# Patient Record
Sex: FEMALE | Race: OTHER | Employment: PART TIME | URBAN - METROPOLITAN AREA
[De-identification: names, ages, dates, MRNs, and addresses within clinical notes are randomized per-mention and may not be internally consistent; named-entity substitution may affect disease eponyms.]

---

## 2023-09-18 ENCOUNTER — HOSPITAL ENCOUNTER (EMERGENCY)
Age: 63
Discharge: HOME | End: 2023-09-18
Attending: EMERGENCY MEDICINE

## 2023-09-18 VITALS
HEART RATE: 86 BPM | HEIGHT: 63 IN | WEIGHT: 160 LBS | SYSTOLIC BLOOD PRESSURE: 173 MMHG | OXYGEN SATURATION: 94 % | DIASTOLIC BLOOD PRESSURE: 87 MMHG | TEMPERATURE: 98.2 F | BODY MASS INDEX: 28.35 KG/M2 | RESPIRATION RATE: 18 BRPM

## 2023-09-18 DIAGNOSIS — N30.00 ACUTE CYSTITIS WITHOUT HEMATURIA: ICD-10-CM

## 2023-09-18 DIAGNOSIS — M54.50 ACUTE LEFT-SIDED LOW BACK PAIN WITHOUT SCIATICA: Primary | ICD-10-CM

## 2023-09-18 LAB
BACTERIA URNS QL MICRO: ABNORMAL
BILIRUB UR QL STRIP: NEGATIVE
CASTS #/AREA URNS LPF: ABNORMAL /LPF
CLARITY UR: ABNORMAL
COLOR UR: YELLOW
EPI CELLS #/AREA URNS HPF: ABNORMAL /HPF
GLUCOSE UR STRIP-MCNC: NEGATIVE MG/DL
HGB UR QL STRIP.AUTO: ABNORMAL
KETONES UR STRIP-MCNC: NEGATIVE MG/DL
LEUKOCYTE ESTERASE UR QL STRIP: ABNORMAL
NITRITE UR QL STRIP: NEGATIVE
PH UR STRIP: 7 [PH] (ref 5–8)
PROT UR STRIP-MCNC: NEGATIVE MG/DL
RBC #/AREA URNS HPF: ABNORMAL /HPF
SP GR UR STRIP: 1.01 (ref 1–1.03)
UROBILINOGEN UR STRIP-ACNC: NORMAL EU/DL (ref 0–1)
WBC #/AREA URNS HPF: ABNORMAL /HPF

## 2023-09-18 PROCEDURE — 96372 THER/PROPH/DIAG INJ SC/IM: CPT

## 2023-09-18 PROCEDURE — 6360000002 HC RX W HCPCS: Performed by: EMERGENCY MEDICINE

## 2023-09-18 PROCEDURE — 99284 EMERGENCY DEPT VISIT MOD MDM: CPT

## 2023-09-18 PROCEDURE — 87186 SC STD MICRODIL/AGAR DIL: CPT

## 2023-09-18 PROCEDURE — 87088 URINE BACTERIA CULTURE: CPT

## 2023-09-18 PROCEDURE — 87086 URINE CULTURE/COLONY COUNT: CPT

## 2023-09-18 PROCEDURE — 81001 URINALYSIS AUTO W/SCOPE: CPT

## 2023-09-18 PROCEDURE — 6370000000 HC RX 637 (ALT 250 FOR IP): Performed by: EMERGENCY MEDICINE

## 2023-09-18 RX ORDER — IBUPROFEN 600 MG/1
600 TABLET ORAL EVERY 6 HOURS PRN
Qty: 28 TABLET | Refills: 0 | Status: SHIPPED | OUTPATIENT
Start: 2023-09-18 | End: 2023-09-25

## 2023-09-18 RX ORDER — IBUPROFEN 600 MG/1
600 TABLET ORAL EVERY 6 HOURS PRN
Qty: 28 TABLET | Refills: 0 | Status: SHIPPED | OUTPATIENT
Start: 2023-09-18 | End: 2023-09-18 | Stop reason: SDUPTHER

## 2023-09-18 RX ORDER — LIDOCAINE 4 G/G
1 PATCH TOPICAL DAILY
Status: DISCONTINUED | OUTPATIENT
Start: 2023-09-18 | End: 2023-09-18 | Stop reason: HOSPADM

## 2023-09-18 RX ORDER — LISINOPRIL 10 MG/1
10 TABLET ORAL DAILY
COMMUNITY

## 2023-09-18 RX ORDER — LISINOPRIL 40 MG/1
40 TABLET ORAL DAILY
COMMUNITY

## 2023-09-18 RX ORDER — FLUTICASONE FUROATE 27.5 UG/1
1 SPRAY, METERED NASAL DAILY
COMMUNITY
Start: 2021-12-07

## 2023-09-18 RX ORDER — ORPHENADRINE CITRATE 30 MG/ML
60 INJECTION INTRAMUSCULAR; INTRAVENOUS ONCE
Status: COMPLETED | OUTPATIENT
Start: 2023-09-18 | End: 2023-09-18

## 2023-09-18 RX ORDER — CEPHALEXIN 500 MG/1
500 CAPSULE ORAL 2 TIMES DAILY
Qty: 14 CAPSULE | Refills: 0 | Status: SHIPPED | OUTPATIENT
Start: 2023-09-18 | End: 2023-09-25

## 2023-09-18 RX ORDER — CYCLOBENZAPRINE HCL 10 MG
10 TABLET ORAL 3 TIMES DAILY PRN
Qty: 20 TABLET | Refills: 0 | Status: SHIPPED | OUTPATIENT
Start: 2023-09-18 | End: 2023-09-25

## 2023-09-18 RX ORDER — KETOROLAC TROMETHAMINE 30 MG/ML
30 INJECTION, SOLUTION INTRAMUSCULAR; INTRAVENOUS ONCE
Status: COMPLETED | OUTPATIENT
Start: 2023-09-18 | End: 2023-09-18

## 2023-09-18 RX ORDER — CEPHALEXIN 250 MG/1
500 CAPSULE ORAL ONCE
Status: COMPLETED | OUTPATIENT
Start: 2023-09-18 | End: 2023-09-18

## 2023-09-18 RX ORDER — CYCLOBENZAPRINE HCL 10 MG
10 TABLET ORAL 3 TIMES DAILY PRN
Qty: 20 TABLET | Refills: 0 | Status: SHIPPED | OUTPATIENT
Start: 2023-09-18 | End: 2023-09-18 | Stop reason: SDUPTHER

## 2023-09-18 RX ORDER — CEPHALEXIN 500 MG/1
500 CAPSULE ORAL 2 TIMES DAILY
Qty: 14 CAPSULE | Refills: 0 | Status: SHIPPED | OUTPATIENT
Start: 2023-09-18 | End: 2023-09-18 | Stop reason: SDUPTHER

## 2023-09-18 RX ORDER — ALBUTEROL SULFATE 90 UG/1
2 AEROSOL, METERED RESPIRATORY (INHALATION) EVERY 6 HOURS PRN
COMMUNITY
Start: 2022-09-17

## 2023-09-18 RX ADMIN — CEPHALEXIN 500 MG: 250 CAPSULE ORAL at 02:17

## 2023-09-18 RX ADMIN — KETOROLAC TROMETHAMINE 30 MG: 30 INJECTION, SOLUTION INTRAMUSCULAR; INTRAVENOUS at 01:01

## 2023-09-18 RX ADMIN — ORPHENADRINE CITRATE 60 MG: 60 INJECTION INTRAMUSCULAR; INTRAVENOUS at 01:01

## 2023-09-18 ASSESSMENT — LIFESTYLE VARIABLES
HOW OFTEN DO YOU HAVE A DRINK CONTAINING ALCOHOL: NEVER
HOW MANY STANDARD DRINKS CONTAINING ALCOHOL DO YOU HAVE ON A TYPICAL DAY: PATIENT DOES NOT DRINK

## 2023-09-18 ASSESSMENT — ENCOUNTER SYMPTOMS
CONSTIPATION: 0
EYE REDNESS: 0
EYE PAIN: 0
TROUBLE SWALLOWING: 0
EYE DISCHARGE: 0
SINUS PRESSURE: 0
BACK PAIN: 1
DIARRHEA: 0
NAUSEA: 0
SORE THROAT: 0
COLOR CHANGE: 0
COUGH: 0
ABDOMINAL PAIN: 0
VOMITING: 0
RHINORRHEA: 0
FACIAL SWELLING: 0
SHORTNESS OF BREATH: 0
CHEST TIGHTNESS: 0
WHEEZING: 0
BLOOD IN STOOL: 0

## 2023-09-18 ASSESSMENT — PAIN - FUNCTIONAL ASSESSMENT: PAIN_FUNCTIONAL_ASSESSMENT: 0-10

## 2023-09-18 ASSESSMENT — PAIN DESCRIPTION - FREQUENCY: FREQUENCY: CONTINUOUS

## 2023-09-18 ASSESSMENT — PAIN SCALES - GENERAL: PAINLEVEL_OUTOF10: 7

## 2023-09-18 NOTE — ED NOTES
Patient presents to the ED with left lower flank pain for the past 3 days. Patient denies any difficulty or burning when urinating.      Yane Rojas RN  09/18/23 3024

## 2023-09-18 NOTE — ED NOTES
Pt was discharged from the ER. Discharge paperwork was reviewed with the patient. Patient had no further questions and showed an understanding of instructions.        Nicol Lundberg RN  09/18/23 4705

## 2023-09-18 NOTE — ED PROVIDER NOTES
every 6 hours as needed for Pain     PHYSICIAN CONSULTS ORDERED THIS ENCOUNTER:  None    FINAL IMPRESSION      1. Acute left-sided low back pain without sciatica    2.  Acute cystitis without hematuria          DISPOSITION/PLAN   DISPOSITION Decision To Discharge 09/18/2023 02:01:52 AM      PATIENT REFERREDTO:  [facility] ED  [address] [facility]  [phone]    If symptoms worsen      DISCHARGEMEDICATIONS:  New Prescriptions    CEPHALEXIN (KEFLEX) 500 MG CAPSULE    Take 1 capsule by mouth 2 times daily for 7 days    CYCLOBENZAPRINE (FLEXERIL) 10 MG TABLET    Take 1 tablet by mouth 3 times daily as needed for Muscle spasms    IBUPROFEN (ADVIL;MOTRIN) 600 MG TABLET    Take 1 tablet by mouth every 6 hours as needed for Pain       (Please note that portions of this note were completed with a voice recognition program.  Efforts were made to edit thedictations but occasionally words are mis-transcribed.)    Jillian Osorio MD  Attending Emergency Physician                       Jillian Osorio MD  09/18/23 8600

## 2023-09-20 LAB
MICROORGANISM SPEC CULT: ABNORMAL
SPECIMEN DESCRIPTION: ABNORMAL

## 2023-09-20 NOTE — PROGRESS NOTES
Pharmacy Note:     Patient has a urinary tract infection which was empirically treated with cephalexin. Urine culture is growing ESBL(+) E. Coli resistant to cephalosporins, but sensitive to Bactrim. Discussed case with Dr. Ivette Her and recommended Bactrim DS 1 tablet PO BID x3 days. He agrees to prescribe. Contacted patient at preferred number and discussed test results and treatment plan. Patient expressed understanding and requested prescription be sent to [facility] on [facility]. Prescription was left on pharmacy voicemail.      Thank you,  Kelin Montes, PharmD, BCPS  [phone]

## 2023-10-04 ENCOUNTER — OFFICE VISIT (OUTPATIENT)
Dept: INTERNAL MEDICINE CLINIC | Age: 63
End: 2023-10-04
Payer: MEDICARE

## 2023-10-04 VITALS
DIASTOLIC BLOOD PRESSURE: 86 MMHG | OXYGEN SATURATION: 98 % | BODY MASS INDEX: 30.47 KG/M2 | WEIGHT: 172 LBS | SYSTOLIC BLOOD PRESSURE: 180 MMHG | HEART RATE: 71 BPM

## 2023-10-04 DIAGNOSIS — Z12.11 SCREEN FOR COLON CANCER: ICD-10-CM

## 2023-10-04 DIAGNOSIS — Z12.31 ENCOUNTER FOR SCREENING MAMMOGRAM FOR BREAST CANCER: Primary | ICD-10-CM

## 2023-10-04 DIAGNOSIS — Z13.9 SCREENING DUE: ICD-10-CM

## 2023-10-04 DIAGNOSIS — Z13.1 ENCOUNTER FOR SCREENING FOR DIABETES MELLITUS: ICD-10-CM

## 2023-10-04 DIAGNOSIS — R30.0 DYSURIA: ICD-10-CM

## 2023-10-04 DIAGNOSIS — Z13.220 SCREENING FOR HYPERLIPIDEMIA: ICD-10-CM

## 2023-10-04 DIAGNOSIS — I49.8 FLUTTERING HEART: ICD-10-CM

## 2023-10-04 PROCEDURE — G0008 ADMIN INFLUENZA VIRUS VAC: HCPCS | Performed by: INTERNAL MEDICINE

## 2023-10-04 PROCEDURE — 99204 OFFICE O/P NEW MOD 45 MIN: CPT | Performed by: INTERNAL MEDICINE

## 2023-10-04 PROCEDURE — 90674 CCIIV4 VAC NO PRSV 0.5 ML IM: CPT | Performed by: INTERNAL MEDICINE

## 2023-10-04 RX ORDER — FLUTICASONE PROPIONATE 50 MCG
1 SPRAY, SUSPENSION (ML) NASAL DAILY
Qty: 32 G | Refills: 1 | Status: SHIPPED | OUTPATIENT
Start: 2023-10-04

## 2023-10-04 RX ORDER — FUROSEMIDE 20 MG/1
20 TABLET ORAL 2 TIMES DAILY
COMMUNITY
Start: 2023-08-24

## 2023-10-04 RX ORDER — FEXOFENADINE HCL 180 MG/1
180 TABLET ORAL DAILY
Qty: 30 TABLET | Refills: 2 | Status: SHIPPED | OUTPATIENT
Start: 2023-10-04

## 2023-10-04 RX ORDER — CARVEDILOL 3.12 MG/1
3.12 TABLET ORAL 2 TIMES DAILY
Qty: 60 TABLET | Refills: 3 | Status: SHIPPED | OUTPATIENT
Start: 2023-10-04

## 2023-10-04 SDOH — ECONOMIC STABILITY: INCOME INSECURITY: HOW HARD IS IT FOR YOU TO PAY FOR THE VERY BASICS LIKE FOOD, HOUSING, MEDICAL CARE, AND HEATING?: NOT HARD AT ALL

## 2023-10-04 SDOH — ECONOMIC STABILITY: FOOD INSECURITY: WITHIN THE PAST 12 MONTHS, THE FOOD YOU BOUGHT JUST DIDN'T LAST AND YOU DIDN'T HAVE MONEY TO GET MORE.: NEVER TRUE

## 2023-10-04 SDOH — ECONOMIC STABILITY: HOUSING INSECURITY
IN THE LAST 12 MONTHS, WAS THERE A TIME WHEN YOU DID NOT HAVE A STEADY PLACE TO SLEEP OR SLEPT IN A SHELTER (INCLUDING NOW)?: NO

## 2023-10-04 SDOH — ECONOMIC STABILITY: FOOD INSECURITY: WITHIN THE PAST 12 MONTHS, YOU WORRIED THAT YOUR FOOD WOULD RUN OUT BEFORE YOU GOT MONEY TO BUY MORE.: NEVER TRUE

## 2023-10-04 ASSESSMENT — PATIENT HEALTH QUESTIONNAIRE - PHQ9
SUM OF ALL RESPONSES TO PHQ QUESTIONS 1-9: 0
1. LITTLE INTEREST OR PLEASURE IN DOING THINGS: 0
SUM OF ALL RESPONSES TO PHQ QUESTIONS 1-9: 0
2. FEELING DOWN, DEPRESSED OR HOPELESS: 0
SUM OF ALL RESPONSES TO PHQ QUESTIONS 1-9: 0
SUM OF ALL RESPONSES TO PHQ9 QUESTIONS 1 & 2: 0
SUM OF ALL RESPONSES TO PHQ QUESTIONS 1-9: 0

## 2023-10-04 NOTE — PROGRESS NOTES
Visit Information    Have you changed or started any medications since your last visit including any over-the-counter medicines, vitamins, or herbal medicines? no   Are you having any side effects from any of your medications? -  no  Have you stopped taking any of your medications? Is so, why? -  no    Have you seen any other physician or provider since your last visit? Yes - Records Obtained  Have you had any other diagnostic tests since your last visit? Yes - Records Obtained  Have you been seen in the emergency room and/or had an admission to a hospital since we last saw you? Yes - Records Obtained  Have you had your routine dental cleaning in the past 6 months? no    Have you activated your [employer] account? If not, what are your barriers?  No:      Patient Care Team:  Edie Davenport MD as PCP - General (Internal Medicine)    Medical History Review  Past Medical, Family, and Social History reviewed and does contribute to the patient presenting condition    Health Maintenance   Topic Date Due    COVID-19 Vaccine (1) Never done    Depression Screen  Never done    HIV screen  Never done    Hepatitis C screen  Never done    DTaP/Tdap/Td vaccine (1 - Tdap) Never done    Cervical cancer screen  Never done    Diabetes screen  Never done    Lipids  Never done    Colorectal Cancer Screen  Never done    Breast cancer screen  Never done    Shingles vaccine (1 of 2) Never done    Flu vaccine (1) Never done    Hepatitis A vaccine  Aged Out    Hepatitis B vaccine  Aged Out    Hib vaccine  Aged Out    Meningococcal (ACWY) vaccine  Aged Out    Pneumococcal 0-64 years Vaccine  Aged Out

## 2023-10-04 NOTE — PROGRESS NOTES
[id number] [place] [id number]  Dept: [phone]  Dept Fax: [phone]    Office Progress/Follow Up Note  Date of patient's visit: 10/4/2023  Patient's Name:  Santo Givens YOB: 1960            Patient Care Team:  Vikas Mccormick MD as PCP - General (Internal Medicine)    REASON FOR VISIT: Establish care    HISTORY OF PRESENT ILLNESS:      Chief Complaint   Patient presents with    New Patient     With lower back pains was seen in er on 9/18/23 for UTI        History was obtained from the patient. Santo Givens is a 57 y.o. is here for [facility]. The patient moved from [address] [place] [address] 2 years back, since then has not seen a primary care physician. The patient reports a history of hypertension, is on lisinopril 50 mg twice daily, Lasix 20 mg twice daily, history of stroke with residual weakness from 15 years back. Also reports a history of flutter, cannot verify if she was diagnosed with atrial flutter/fibrillation. Is not on anticoagulation. Denies history of congestive heart failure. Currently denies other concerns. She reports blood pressure may be high. Given that she has not used her lisinopril this morning. She denies headaches, vision problems. She reports following a low-sodium diet. There is no problem list on file for this patient.       Health Maintenance Due   Topic Date Due    COVID-19 Vaccine (1) Never done    Depression Screen  Never done    HIV screen  Never done    Hepatitis C screen  Never done    DTaP/Tdap/Td vaccine (1 - Tdap) Never done    Cervical cancer screen  Never done    Diabetes screen  Never done    Lipids  Never done    Colorectal Cancer Screen  Never done    Breast cancer screen  Never done    Shingles vaccine (1 of 2) Never done    Flu vaccine (1) Never done       No Known Allergies      MEDICATIONS:     Current Outpatient Medications   Medication Sig Dispense Refill    furosemide (LASIX)

## 2023-10-04 NOTE — PATIENT INSTRUCTIONS
Try to exercise 30 mins a day, 5 times a week  Try getting a blood pressure machine at home, check blood pressures once a day.  Call clinic if > 140/90 despite taking new medication along with lisinopril and lasix

## 2023-10-05 ENCOUNTER — TELEPHONE (OUTPATIENT)
Dept: INTERNAL MEDICINE CLINIC | Age: 63
End: 2023-10-05

## 2023-10-05 DIAGNOSIS — Z13.1 ENCOUNTER FOR SCREENING FOR DIABETES MELLITUS: Primary | ICD-10-CM

## 2023-10-05 DIAGNOSIS — Z13.220 SCREENING FOR HYPERLIPIDEMIA: ICD-10-CM

## 2023-10-05 NOTE — TELEPHONE ENCOUNTER
Clarita from [facility] lab called and they are getting an ABN Back on patients A1C and Lipid Panel that was ordered yesterday. They are needing new orders placed.

## 2023-10-17 ENCOUNTER — TELEPHONE (OUTPATIENT)
Dept: INTERNAL MEDICINE CLINIC | Age: 63
End: 2023-10-17

## 2023-10-17 ENCOUNTER — HOSPITAL ENCOUNTER (OUTPATIENT)
Age: 63
Discharge: HOME | End: 2023-10-17
Payer: MEDICARE

## 2023-10-17 DIAGNOSIS — R73.03 PRE-DIABETES: Primary | ICD-10-CM

## 2023-10-17 DIAGNOSIS — I49.8 FLUTTERING HEART: ICD-10-CM

## 2023-10-17 DIAGNOSIS — I10 PRIMARY HYPERTENSION: ICD-10-CM

## 2023-10-17 LAB
EKG ATRIAL RATE: 70 BPM
EKG P AXIS: 61 DEGREES
EKG P-R INTERVAL: 184 MS
EKG Q-T INTERVAL: 412 MS
EKG QRS DURATION: 84 MS
EKG QTC CALCULATION (BAZETT): 444 MS
EKG R AXIS: 72 DEGREES
EKG T AXIS: 53 DEGREES
EKG VENTRICULAR RATE: 70 BPM

## 2023-10-17 PROCEDURE — 93005 ELECTROCARDIOGRAM TRACING: CPT

## 2023-10-17 PROCEDURE — 93010 ELECTROCARDIOGRAM REPORT: CPT | Performed by: INTERNAL MEDICINE

## 2023-10-17 NOTE — TELEPHONE ENCOUNTER
Patient is at the lab now and her order for Lipid and HGB A1C fired an ABN. The codes from the the 5th are not working. New Codes and orders are needed. 3rd request for this.     Please advise

## 2023-10-18 ENCOUNTER — HOSPITAL ENCOUNTER (OUTPATIENT)
Age: 63
Discharge: HOME | End: 2023-10-18
Attending: INTERNAL MEDICINE
Payer: MEDICARE

## 2023-10-18 ENCOUNTER — HOSPITAL ENCOUNTER (OUTPATIENT)
Dept: WOMENS IMAGING | Age: 63
Discharge: HOME | End: 2023-10-20
Attending: INTERNAL MEDICINE
Payer: MEDICARE

## 2023-10-18 DIAGNOSIS — R73.03 PRE-DIABETES: ICD-10-CM

## 2023-10-18 DIAGNOSIS — R30.0 DYSURIA: ICD-10-CM

## 2023-10-18 DIAGNOSIS — Z13.9 SCREENING DUE: ICD-10-CM

## 2023-10-18 DIAGNOSIS — Z12.31 ENCOUNTER FOR SCREENING MAMMOGRAM FOR BREAST CANCER: ICD-10-CM

## 2023-10-18 DIAGNOSIS — I10 PRIMARY HYPERTENSION: ICD-10-CM

## 2023-10-18 LAB
ALBUMIN SERPL-MCNC: 4.4 G/DL (ref 3.5–5.2)
ALP SERPL-CCNC: 72 U/L (ref 35–104)
ALT SERPL-CCNC: 12 U/L (ref 5–33)
ANION GAP SERPL CALCULATED.3IONS-SCNC: 10 MMOL/L (ref 9–17)
AST SERPL-CCNC: 20 U/L
BACTERIA URNS QL MICRO: ABNORMAL
BILIRUB SERPL-MCNC: 0.5 MG/DL (ref 0.3–1.2)
BILIRUB UR QL STRIP: NEGATIVE
BUN SERPL-MCNC: 14 MG/DL (ref 8–23)
CALCIUM SERPL-MCNC: 9.7 MG/DL (ref 8.6–10.4)
CASTS #/AREA URNS LPF: ABNORMAL /LPF
CHLORIDE SERPL-SCNC: 104 MMOL/L (ref 98–107)
CHOLEST SERPL-MCNC: 186 MG/DL
CHOLESTEROL/HDL RATIO: 2.5
CLARITY UR: ABNORMAL
CO2 SERPL-SCNC: 29 MMOL/L (ref 20–31)
COLOR UR: YELLOW
CREAT SERPL-MCNC: 0.7 MG/DL (ref 0.5–0.9)
EPI CELLS #/AREA URNS HPF: ABNORMAL /HPF
ERYTHROCYTE [DISTWIDTH] IN BLOOD BY AUTOMATED COUNT: 13.1 % (ref 11.5–14.9)
EST. AVERAGE GLUCOSE BLD GHB EST-MCNC: 103 MG/DL
GFR SERPL CREATININE-BSD FRML MDRD: >60 ML/MIN/1.73M2
GLUCOSE P FAST SERPL-MCNC: 98 MG/DL (ref 70–99)
GLUCOSE SERPL-MCNC: 98 MG/DL (ref 70–99)
GLUCOSE UR STRIP-MCNC: NEGATIVE MG/DL
HBA1C MFR BLD: 5.2 % (ref 4–6)
HCT VFR BLD AUTO: 40 % (ref 36–46)
HDLC SERPL-MCNC: 75 MG/DL
HGB BLD-MCNC: 13.5 G/DL (ref 12–16)
HGB UR QL STRIP.AUTO: ABNORMAL
KETONES UR STRIP-MCNC: ABNORMAL MG/DL
LDLC SERPL CALC-MCNC: 94 MG/DL (ref 0–130)
LEUKOCYTE ESTERASE UR QL STRIP: ABNORMAL
MCH RBC QN AUTO: 30.3 PG (ref 26–34)
MCHC RBC AUTO-ENTMCNC: 33.8 G/DL (ref 31–37)
MCV RBC AUTO: 89.8 FL (ref 80–100)
NITRITE UR QL STRIP: NEGATIVE
PH UR STRIP: 6 [PH] (ref 5–8)
PLATELET # BLD AUTO: 215 K/UL (ref 150–450)
PMV BLD AUTO: 7 FL (ref 6–12)
POTASSIUM SERPL-SCNC: 3.7 MMOL/L (ref 3.7–5.3)
PROT SERPL-MCNC: 7.2 G/DL (ref 6.4–8.3)
PROT UR STRIP-MCNC: ABNORMAL MG/DL
RBC # BLD AUTO: 4.45 M/UL (ref 4–5.2)
RBC #/AREA URNS HPF: ABNORMAL /HPF
SODIUM SERPL-SCNC: 143 MMOL/L (ref 135–144)
SP GR UR STRIP: 1.02 (ref 1–1.03)
TRIGL SERPL-MCNC: 84 MG/DL
UROBILINOGEN UR STRIP-ACNC: NORMAL EU/DL (ref 0–1)
WBC #/AREA URNS HPF: ABNORMAL /HPF
WBC OTHER # BLD: 5.6 K/UL (ref 3.5–11)

## 2023-10-18 PROCEDURE — 83036 HEMOGLOBIN GLYCOSYLATED A1C: CPT

## 2023-10-18 PROCEDURE — 87086 URINE CULTURE/COLONY COUNT: CPT

## 2023-10-18 PROCEDURE — 80061 LIPID PANEL: CPT

## 2023-10-18 PROCEDURE — 85027 COMPLETE CBC AUTOMATED: CPT

## 2023-10-18 PROCEDURE — 36415 COLL VENOUS BLD VENIPUNCTURE: CPT

## 2023-10-18 PROCEDURE — 80053 COMPREHEN METABOLIC PANEL: CPT

## 2023-10-18 PROCEDURE — 77063 BREAST TOMOSYNTHESIS BI: CPT

## 2023-10-18 PROCEDURE — 81001 URINALYSIS AUTO W/SCOPE: CPT

## 2023-10-19 ENCOUNTER — TELEPHONE (OUTPATIENT)
Dept: INTERNAL MEDICINE CLINIC | Age: 63
End: 2023-10-19

## 2023-10-19 DIAGNOSIS — R82.90 ABNORMAL FINDING ON URINALYSIS: Primary | ICD-10-CM

## 2023-10-19 LAB
MICROORGANISM SPEC CULT: NORMAL
SPECIMEN DESCRIPTION: NORMAL

## 2023-10-19 NOTE — TELEPHONE ENCOUNTER
Inform patient that blood work is within normal limits. Urine shows mild amount of blood and mild protein.   Would like to repeat urine testing in 2 weeks --please get this a day prior to appointment on 10/30/2023

## 2023-10-30 ENCOUNTER — HOSPITAL ENCOUNTER (OUTPATIENT)
Age: 63
Setting detail: SPECIMEN
Discharge: HOME | End: 2023-10-30

## 2023-10-30 ENCOUNTER — OFFICE VISIT (OUTPATIENT)
Dept: INTERNAL MEDICINE CLINIC | Age: 63
End: 2023-10-30
Payer: MEDICARE

## 2023-10-30 VITALS
OXYGEN SATURATION: 96 % | DIASTOLIC BLOOD PRESSURE: 86 MMHG | SYSTOLIC BLOOD PRESSURE: 132 MMHG | HEIGHT: 63 IN | HEART RATE: 78 BPM | WEIGHT: 178 LBS | BODY MASS INDEX: 31.54 KG/M2

## 2023-10-30 DIAGNOSIS — Z13.820 SCREENING FOR OSTEOPOROSIS: Primary | ICD-10-CM

## 2023-10-30 DIAGNOSIS — R82.90 ABNORMAL FINDING ON URINALYSIS: ICD-10-CM

## 2023-10-30 DIAGNOSIS — M85.89 OTHER SPECIFIED DISORDERS OF BONE DENSITY AND STRUCTURE, MULTIPLE SITES: ICD-10-CM

## 2023-10-30 LAB
BACTERIA URNS QL MICRO: ABNORMAL
BILIRUB UR QL STRIP: NEGATIVE
CASTS #/AREA URNS LPF: ABNORMAL /LPF (ref 0–8)
CLARITY UR: CLEAR
COLOR UR: YELLOW
EPI CELLS #/AREA URNS HPF: ABNORMAL /HPF (ref 0–5)
GLUCOSE UR STRIP-MCNC: NEGATIVE MG/DL
HGB UR QL STRIP.AUTO: ABNORMAL
KETONES UR STRIP-MCNC: NEGATIVE MG/DL
LEUKOCYTE ESTERASE UR QL STRIP: ABNORMAL
NITRITE UR QL STRIP: NEGATIVE
PH UR STRIP: 7.5 [PH] (ref 5–8)
PROT UR STRIP-MCNC: NEGATIVE MG/DL
RBC #/AREA URNS HPF: ABNORMAL /HPF (ref 0–4)
SP GR UR STRIP: 1 (ref 1–1.03)
UROBILINOGEN UR STRIP-ACNC: NORMAL EU/DL (ref 0–1)
WBC #/AREA URNS HPF: ABNORMAL /HPF (ref 0–5)

## 2023-10-30 PROCEDURE — 90715 TDAP VACCINE 7 YRS/> IM: CPT | Performed by: INTERNAL MEDICINE

## 2023-10-30 PROCEDURE — 1123F ACP DISCUSS/DSCN MKR DOCD: CPT | Performed by: INTERNAL MEDICINE

## 2023-10-30 PROCEDURE — G0009 ADMIN PNEUMOCOCCAL VACCINE: HCPCS | Performed by: INTERNAL MEDICINE

## 2023-10-30 PROCEDURE — 90471 IMMUNIZATION ADMIN: CPT | Performed by: INTERNAL MEDICINE

## 2023-10-30 PROCEDURE — 90732 PPSV23 VACC 2 YRS+ SUBQ/IM: CPT | Performed by: INTERNAL MEDICINE

## 2023-10-30 PROCEDURE — 99214 OFFICE O/P EST MOD 30 MIN: CPT | Performed by: INTERNAL MEDICINE

## 2023-10-30 RX ORDER — LISINOPRIL 40 MG/1
40 TABLET ORAL DAILY
Qty: 30 TABLET | Refills: 3 | Status: SHIPPED | OUTPATIENT
Start: 2023-10-30

## 2023-10-30 RX ORDER — CARVEDILOL 3.12 MG/1
3.12 TABLET ORAL 2 TIMES DAILY
Qty: 60 TABLET | Refills: 3 | Status: SHIPPED | OUTPATIENT
Start: 2023-10-30

## 2023-10-30 RX ORDER — ALBUTEROL SULFATE 90 UG/1
2 AEROSOL, METERED RESPIRATORY (INHALATION) EVERY 6 HOURS PRN
Qty: 18 G | Refills: 3 | Status: SHIPPED | OUTPATIENT
Start: 2023-10-30

## 2023-10-30 RX ORDER — PREDNISONE 20 MG/1
TABLET ORAL
COMMUNITY
Start: 2023-05-22

## 2023-10-30 RX ORDER — IBUPROFEN 600 MG/1
600 TABLET ORAL EVERY 6 HOURS PRN
Qty: 28 TABLET | Refills: 0 | Status: SHIPPED | OUTPATIENT
Start: 2023-10-30 | End: 2023-11-06

## 2023-10-30 RX ORDER — FUROSEMIDE 20 MG/1
20 TABLET ORAL 2 TIMES DAILY
Qty: 60 TABLET | Refills: 2 | Status: SHIPPED | OUTPATIENT
Start: 2023-10-30

## 2023-10-30 RX ORDER — FEXOFENADINE HCL 180 MG/1
180 TABLET ORAL DAILY
Qty: 30 TABLET | Refills: 2 | Status: SHIPPED | OUTPATIENT
Start: 2023-10-30

## 2023-10-30 NOTE — PROGRESS NOTES
[address] [place] [id number]  Dept: [phone]  Dept Fax: [phone]    Office Progress/Follow Up Note  Date of patient's visit: 10/30/2023  Patient's Name:  Brett Wiggins YOB: 1960            Patient Care Team:  Forest Ruelas MD as PCP - General (Internal Medicine)  Forest Ruelas MD as PCP - Empaneled Provider    REASON FOR VISIT: follow up visit    HISTORY OF PRESENT ILLNESS:      Chief Complaint   Patient presents with    Results     Would like to go over all recent testing. History was obtained from the patient. Brett Wiggins is a 70 y.o. is here for a follow-up visit, the patient reports feeling well. Denies any active concerns at this time. She denies any visible bleeding in her urine. Patient reports that in the past she was told that she had bone abnormality, possible loss of bone and arthritis. No joint swelling. There is no problem list on file for this patient.       Health Maintenance Due   Topic Date Due    HIV screen  Never done    Hepatitis C screen  Never done    Cervical cancer screen  Never done    Colorectal Cancer Screen  Never done    Shingles vaccine (1 of 2) Never done    DEXA (modify frequency per FRAX score)  Never done    COVID-19 Vaccine (2 - Moderna series) 10/26/2023    Annual Wellness Visit (AWV)  10/04/2023       No Known Allergies      MEDICATIONS:     Current Outpatient Medications   Medication Sig Dispense Refill    predniSONE (DELTASONE) 20 MG tablet Uses as needed      fexofenadine (ALLEGRA) 180 MG tablet Take 1 tablet by mouth daily 30 tablet 2    lisinopril (PRINIVIL;ZESTRIL) 40 MG tablet Take 1 tablet by mouth daily 30 tablet 3    ibuprofen (ADVIL;MOTRIN) 600 MG tablet Take 1 tablet by mouth every 6 hours as needed for Pain 28 tablet 0    albuterol sulfate HFA (PROVENTIL;VENTOLIN;PROAIR) 108 (90 Base) MCG/ACT inhaler Inhale 2 puffs into the lungs every 6 hours as

## 2023-10-30 NOTE — PROGRESS NOTES
Visit Information    Have you changed or started any medications since your last visit including any over-the-counter medicines, vitamins, or herbal medicines? no   Are you having any side effects from any of your medications? -  no  Have you stopped taking any of your medications? Is so, why? -  no    Have you seen any other physician or provider since your last visit? No  Have you had any other diagnostic tests since your last visit? Yes - Records Obtained  Have you been seen in the emergency room and/or had an admission to a hospital since we last saw you? No  Have you had your routine dental cleaning in the past 6 months? no    Have you activated your [employer] account? If not, what are your barriers?  No:      Patient Care Team:  Thien Man MD as PCP - General (Internal Medicine)  Thien Man MD as PCP - Empaneled Provider    Medical History Review  Past Medical, Family, and Social History reviewed and does contribute to the patient presenting condition    Health Maintenance   Topic Date Due    HIV screen  Never done    Hepatitis C screen  Never done    DTaP/Tdap/Td vaccine (1 - Tdap) Never done    Cervical cancer screen  Never done    Colorectal Cancer Screen  Never done    Shingles vaccine (1 of 2) Never done    DEXA (modify frequency per FRAX score)  Never done    Pneumococcal 65+ years Vaccine (1 - PCV) Never done    COVID-19 Vaccine (2 - Caron Lesches series) 10/26/2023    Annual Wellness Visit (AWV)  10/04/2023    Depression Screen  10/04/2024    Breast cancer screen  10/18/2025    Lipids  10/18/2028    Flu vaccine  Completed    Hepatitis A vaccine  Aged Out    Hepatitis B vaccine  Aged Out    Hib vaccine  Aged Out    Meningococcal (ACWY) vaccine  Aged Out    Diabetes screen  Discontinued

## 2023-10-31 LAB
CREAT UR-MCNC: 12.1 MG/DL (ref 28–217)
TOTAL PROTEIN, URINE: <4 MG/DL
URINE TOTAL PROTEIN CREATININE RATIO: ABNORMAL (ref 0–0.2)

## 2023-11-02 ENCOUNTER — TELEPHONE (OUTPATIENT)
Dept: INTERNAL MEDICINE CLINIC | Age: 63
End: 2023-11-02

## 2023-11-02 DIAGNOSIS — R19.5 POSITIVE COLORECTAL CANCER SCREENING USING COLOGUARD TEST: Primary | ICD-10-CM

## 2023-11-02 LAB — NONINV COLON CA DNA+OCC BLD SCRN STL QL: POSITIVE

## 2023-11-02 NOTE — TELEPHONE ENCOUNTER
Please inform patient that colon cancer screening was positive. We will need a colonoscopy to better evaluate. I will refer to gastroenterology.

## 2023-11-13 ENCOUNTER — HOSPITAL ENCOUNTER (OUTPATIENT)
Dept: WOMENS IMAGING | Age: 63
Discharge: HOME | End: 2023-11-15
Attending: INTERNAL MEDICINE
Payer: MEDICARE

## 2023-11-13 DIAGNOSIS — Z13.820 SCREENING FOR OSTEOPOROSIS: ICD-10-CM

## 2023-11-13 DIAGNOSIS — M85.89 OTHER SPECIFIED DISORDERS OF BONE DENSITY AND STRUCTURE, MULTIPLE SITES: ICD-10-CM

## 2023-11-13 PROCEDURE — 77080 DXA BONE DENSITY AXIAL: CPT

## 2023-11-16 ENCOUNTER — OFFICE VISIT (OUTPATIENT)
Dept: OBGYN CLINIC | Age: 63
End: 2023-11-16

## 2023-11-16 ENCOUNTER — TELEPHONE (OUTPATIENT)
Dept: GASTROENTEROLOGY | Age: 63
End: 2023-11-16

## 2023-11-16 ENCOUNTER — HOSPITAL ENCOUNTER (OUTPATIENT)
Age: 63
Setting detail: SPECIMEN
Discharge: HOME | End: 2023-11-16

## 2023-11-16 VITALS — WEIGHT: 179 LBS | BODY MASS INDEX: 32.94 KG/M2 | HEIGHT: 62 IN

## 2023-11-16 DIAGNOSIS — R19.5 POSITIVE COLORECTAL CANCER SCREENING USING COLOGUARD TEST: Primary | ICD-10-CM

## 2023-11-16 DIAGNOSIS — Z01.419 WELL WOMAN EXAM WITH ROUTINE GYNECOLOGICAL EXAM: Primary | ICD-10-CM

## 2023-11-16 DIAGNOSIS — Z11.51 SPECIAL SCREENING EXAMINATION FOR HUMAN PAPILLOMAVIRUS (HPV): ICD-10-CM

## 2023-11-16 PROBLEM — I10 HTN (HYPERTENSION): Status: ACTIVE | Noted: 2023-11-16

## 2023-11-16 NOTE — PROGRESS NOTES
Repeat Annual every 1 year  Cervical Cytology Evaluation begins at 24years old. If Negative Cytology, Follow-up screening per current guidelines. Mammograms every 1 year. If 37 yo and last mammogram was negative. Calcium and Vitamin D dosing reviewed. Colonoscopy screening reviewed as well as onset for bone density testing. Birth control and barrier recommendations discussed. STD counseling and prevention reviewed. Gardisil counseling completed for all patients 7-38 yo. Routine health maintenance per patients PCP. Orders Placed This Encounter   Procedures    PAP SMEAR     Patient History:    No LMP recorded. Patient is postmenopausal.  OBGYN Status: Postmenopausal  Past Surgical History:  No date: BREAST CYST EXCISION; Right  1980:  SECTION  No date: ENDOMETRIAL ABLATION  No date: SKIN CANCER EXCISION      Social History    Tobacco Use      Smoking status: Never      Smokeless tobacco: Never       Standing Status:   Future     Standing Expiration Date:   2024     Order Specific Question:   Collection Type     Answer: Thin Prep     Order Specific Question:   Prior Abnormal Pap Test     Answer:   No     Order Specific Question:   Screening or Diagnostic     Answer:   Screening     Order Specific Question:   HPV Requested? Answer:   Yes     Order Specific Question:   High Risk Patient     Answer:   N/A           The patient, Ashish Jane is a 70 y.o. female, was seen with a total time spent of 30 minutes for the visit on this date of service by the E/M provider. The time component had both face to face and non face to face time spent in determining the total time component. Counseling and education regarding her diagnosis listed below and her options regarding those diagnoses were also included in determining her time component. Diagnosis Orders   1. Well woman exam with routine gynecological exam  PAP SMEAR      2.  Special screening examination for human papillomavirus

## 2023-11-16 NOTE — TELEPHONE ENCOUNTER
Writer rec'd referral from PCP asking to schedule pt for colon, writer notes pt is not established with any provider in this practice, per colon screen questionnaire pt can be scheduled for colon. [facility] Ciro Wednesday [email or web address] colon(new positive cologuard) golytely/dulc PAT-TBD by [facility]. Reviewed bowel prep instructions with patient over phone and mailed to home address.

## 2023-11-17 RX ORDER — BISACODYL 5 MG/1
TABLET, DELAYED RELEASE ORAL
Qty: 4 TABLET | Refills: 0 | Status: SHIPPED | OUTPATIENT
Start: 2023-11-17

## 2023-11-17 RX ORDER — POLYETHYLENE GLYCOL 3350, SODIUM SULFATE ANHYDROUS, SODIUM BICARBONATE, SODIUM CHLORIDE, POTASSIUM CHLORIDE 236; 22.74; 6.74; 5.86; 2.97 G/4L; G/4L; G/4L; G/4L; G/4L
POWDER, FOR SOLUTION ORAL
Qty: 4000 ML | Refills: 0 | Status: SHIPPED | OUTPATIENT
Start: 2023-11-17

## 2023-11-21 LAB
HPV I/H RISK 4 DNA CVX QL NAA+PROBE: DETECTED
HPV SAMPLE: ABNORMAL
HPV, INTERPRETATION: ABNORMAL
HPV16 DNA CVX QL NAA+PROBE: NOT DETECTED
HPV18 DNA CVX QL NAA+PROBE: NOT DETECTED
SPECIMEN DESCRIPTION: ABNORMAL

## 2023-11-22 ENCOUNTER — HOSPITAL ENCOUNTER (OUTPATIENT)
Dept: PREADMISSION TESTING | Age: 63
Discharge: HOME | End: 2023-11-26

## 2023-11-22 VITALS — HEIGHT: 62 IN | BODY MASS INDEX: 30.36 KG/M2 | WEIGHT: 165 LBS

## 2023-11-22 RX ORDER — IBUPROFEN 600 MG/1
600 TABLET ORAL EVERY 6 HOURS PRN
COMMUNITY

## 2023-11-22 NOTE — PROGRESS NOTES
Pre-op Instructions For Out-Patient Endoscopy Surgery    Medication Instructions:  Please stop herbs and any supplements now (includes vitamins and minerals). Please contact your surgeon and prescribing physician for pre-op instructions for any blood thinners. Ibuprofen     If you have inhalers/aerosol treatments at home, please use them the morning of your surgery and bring the inhalers with you to the hospital.    Please take the following medications the morning of your surgery with a sip of water:    Inhaler, Carvedilol, and Prednisone (if taking)     Surgery Instructions:  After midnight before surgery:  Do not eat or drink anything, including water, mints, gum, and hard candy. You may brush your teeth without swallowing. No smoking, chewing tobacco, or street drugs. Please shower or bathe before surgery. Please do not wear any cologne, lotion, powder, jewelry, piercings, perfume, makeup, nail polish, hair accessories, or hair spray on the day of surgery. Wear loose comfortable clothing. Leave your valuables at home but bring a payment source for any after-surgery prescriptions you plan to fill at [facility]. Bring a storage case for any glasses/contacts. An adult who is responsible for you MUST drive you home and should be with you for the first 24 hours after surgery. The Day of Surgery:  Arrive at [facility] AT [facility] Surgery Entrance at the time directed by your surgeon and check in at the desk. If you have a living will or healthcare power of , please bring a copy. You will be taken to the pre-op holding area where you will be prepared for surgery. A physical assessment will be performed by a nurse practitioner or house officer. Your IV will be started and you will meet your anesthesiologist.    When you go to surgery, your family will be directed to the surgical waiting room, where the doctor should speak with them after your surgery.     After

## 2023-11-27 NOTE — PRE-PROCEDURE INSTRUCTIONS
No answer, left message ? Unable to leave message ? Voice mail not set up    When were you told to arrive at hospital ?      Do you have a  ? Are you on any blood thinners ? If yes when did you stop taking ? Do you have your prep Rx filled and instruction ? Nothing to eat the day before , only clear liquids. Are you experiencing any covid symptoms ? Do you have any infections or rash we should be aware of ? Do you have the Hibiclens soap to use the night before and the morning of surgery ? Nothing to eat or drink after midnight, only a sip of water to take any medication instructed to take the night before. Wear comfortable clothing, leave any valuables at home, remove any jewelry and body piercing .

## 2023-11-28 ENCOUNTER — ANESTHESIA EVENT (OUTPATIENT)
Dept: ENDOSCOPY | Age: 63
End: 2023-11-28
Payer: MEDICARE

## 2023-11-29 ENCOUNTER — ANESTHESIA (OUTPATIENT)
Dept: ENDOSCOPY | Age: 63
End: 2023-11-29
Payer: MEDICARE

## 2023-11-29 ENCOUNTER — HOSPITAL ENCOUNTER (OUTPATIENT)
Age: 63
Setting detail: OUTPATIENT SURGERY
Discharge: HOME | End: 2023-11-29
Attending: INTERNAL MEDICINE | Admitting: INTERNAL MEDICINE
Payer: MEDICARE

## 2023-11-29 VITALS
BODY MASS INDEX: 30.36 KG/M2 | HEIGHT: 62 IN | TEMPERATURE: 98.4 F | SYSTOLIC BLOOD PRESSURE: 134 MMHG | DIASTOLIC BLOOD PRESSURE: 70 MMHG | OXYGEN SATURATION: 97 % | RESPIRATION RATE: 18 BRPM | HEART RATE: 74 BPM | WEIGHT: 165 LBS

## 2023-11-29 DIAGNOSIS — R19.5 POSITIVE COLORECTAL CANCER SCREENING USING COLOGUARD TEST: ICD-10-CM

## 2023-11-29 PROCEDURE — 2500000003 HC RX 250 WO HCPCS: Performed by: NURSE ANESTHETIST, CERTIFIED REGISTERED

## 2023-11-29 PROCEDURE — 3609010600 HC COLONOSCOPY POLYPECTOMY SNARE/COLD BIOPSY: Performed by: INTERNAL MEDICINE

## 2023-11-29 PROCEDURE — 2580000003 HC RX 258: Performed by: NURSE ANESTHETIST, CERTIFIED REGISTERED

## 2023-11-29 PROCEDURE — 2580000003 HC RX 258: Performed by: ANESTHESIOLOGY

## 2023-11-29 PROCEDURE — 7100000010 HC PHASE II RECOVERY - FIRST 15 MIN: Performed by: INTERNAL MEDICINE

## 2023-11-29 PROCEDURE — 3700000001 HC ADD 15 MINUTES (ANESTHESIA): Performed by: INTERNAL MEDICINE

## 2023-11-29 PROCEDURE — 6360000002 HC RX W HCPCS: Performed by: NURSE ANESTHETIST, CERTIFIED REGISTERED

## 2023-11-29 PROCEDURE — 7100000011 HC PHASE II RECOVERY - ADDTL 15 MIN: Performed by: INTERNAL MEDICINE

## 2023-11-29 PROCEDURE — 3700000000 HC ANESTHESIA ATTENDED CARE: Performed by: INTERNAL MEDICINE

## 2023-11-29 PROCEDURE — 88305 TISSUE EXAM BY PATHOLOGIST: CPT

## 2023-11-29 PROCEDURE — 2709999900 HC NON-CHARGEABLE SUPPLY: Performed by: INTERNAL MEDICINE

## 2023-11-29 RX ORDER — PROPOFOL 10 MG/ML
INJECTION, EMULSION INTRAVENOUS PRN
Status: DISCONTINUED | OUTPATIENT
Start: 2023-11-29 | End: 2023-11-29 | Stop reason: SDUPTHER

## 2023-11-29 RX ORDER — SODIUM CHLORIDE, SODIUM LACTATE, POTASSIUM CHLORIDE, CALCIUM CHLORIDE 600; 310; 30; 20 MG/100ML; MG/100ML; MG/100ML; MG/100ML
INJECTION, SOLUTION INTRAVENOUS CONTINUOUS
Status: DISCONTINUED | OUTPATIENT
Start: 2023-11-29 | End: 2023-11-29 | Stop reason: HOSPADM

## 2023-11-29 RX ORDER — SODIUM CHLORIDE 0.9 % (FLUSH) 0.9 %
5-40 SYRINGE (ML) INJECTION PRN
Status: DISCONTINUED | OUTPATIENT
Start: 2023-11-29 | End: 2023-11-29 | Stop reason: HOSPADM

## 2023-11-29 RX ORDER — SODIUM CHLORIDE 9 MG/ML
INJECTION, SOLUTION INTRAVENOUS PRN
Status: DISCONTINUED | OUTPATIENT
Start: 2023-11-29 | End: 2023-11-29 | Stop reason: HOSPADM

## 2023-11-29 RX ORDER — SODIUM CHLORIDE, SODIUM LACTATE, POTASSIUM CHLORIDE, CALCIUM CHLORIDE 600; 310; 30; 20 MG/100ML; MG/100ML; MG/100ML; MG/100ML
INJECTION, SOLUTION INTRAVENOUS CONTINUOUS PRN
Status: DISCONTINUED | OUTPATIENT
Start: 2023-11-29 | End: 2023-11-29 | Stop reason: SDUPTHER

## 2023-11-29 RX ORDER — GLYCOPYRROLATE 0.2 MG/ML
INJECTION INTRAMUSCULAR; INTRAVENOUS PRN
Status: DISCONTINUED | OUTPATIENT
Start: 2023-11-29 | End: 2023-11-29 | Stop reason: SDUPTHER

## 2023-11-29 RX ORDER — LIDOCAINE HYDROCHLORIDE 10 MG/ML
1 INJECTION, SOLUTION EPIDURAL; INFILTRATION; INTRACAUDAL; PERINEURAL
Status: DISCONTINUED | OUTPATIENT
Start: 2023-11-29 | End: 2023-11-29 | Stop reason: HOSPADM

## 2023-11-29 RX ORDER — LIDOCAINE HYDROCHLORIDE 10 MG/ML
INJECTION, SOLUTION EPIDURAL; INFILTRATION; INTRACAUDAL; PERINEURAL PRN
Status: DISCONTINUED | OUTPATIENT
Start: 2023-11-29 | End: 2023-11-29 | Stop reason: SDUPTHER

## 2023-11-29 RX ORDER — SODIUM CHLORIDE 0.9 % (FLUSH) 0.9 %
5-40 SYRINGE (ML) INJECTION EVERY 12 HOURS SCHEDULED
Status: DISCONTINUED | OUTPATIENT
Start: 2023-11-29 | End: 2023-11-29 | Stop reason: HOSPADM

## 2023-11-29 RX ADMIN — SODIUM CHLORIDE, POTASSIUM CHLORIDE, SODIUM LACTATE AND CALCIUM CHLORIDE: 600; 310; 30; 20 INJECTION, SOLUTION INTRAVENOUS at 08:50

## 2023-11-29 RX ADMIN — SODIUM CHLORIDE, POTASSIUM CHLORIDE, SODIUM LACTATE AND CALCIUM CHLORIDE: 600; 310; 30; 20 INJECTION, SOLUTION INTRAVENOUS at 10:22

## 2023-11-29 RX ADMIN — LIDOCAINE HYDROCHLORIDE 50 MG: 10 INJECTION, SOLUTION EPIDURAL; INFILTRATION; INTRACAUDAL; PERINEURAL at 10:27

## 2023-11-29 RX ADMIN — PROPOFOL 450 MG: 10 INJECTION, EMULSION INTRAVENOUS at 10:27

## 2023-11-29 RX ADMIN — GLYCOPYRROLATE 0.2 MG: 0.2 INJECTION INTRAMUSCULAR; INTRAVENOUS at 10:38

## 2023-11-29 ASSESSMENT — PAIN - FUNCTIONAL ASSESSMENT: PAIN_FUNCTIONAL_ASSESSMENT: 0-10

## 2023-11-29 ASSESSMENT — ENCOUNTER SYMPTOMS
SHORTNESS OF BREATH: 0
SORE THROAT: 0
BACK PAIN: 1
COUGH: 0

## 2023-11-29 NOTE — DISCHARGE INSTRUCTIONS
lot of processed, low fiber foods. Diverticuli develop due to firm stool and high pressures in the colon, along with secondary spasm, which causes outpouchings to occus where the small arteries penetrate the wall of the colon to feed the internal lining. These occur most commonly on the left side and lower portions of the colon. Diverticuli can then cause bleeding from the arteries at these sites of weakness when they rupture or the diveritculi can get blocked with stool and debris and become obstructed, causing diverticulosis, which is due to an infection. When these are infected, diverticulitis, it is often treated with antibiotics and bowel rest, but when severe, recurrent, or if rupture of the colon occurs, it may require surgery. Following appropriate dietary changes and taking the proper precautions is therefore very important. DIET:  You should increase your dietary fiber intake and take a fiber supplement twice a day. Make sure that you are taking a supplement that is just fiber and is not a laxative, which should be noted on the package. Starting a fiber supplement may cause increased gas, more frequent bowel movements, and distension at first but this should improve after a couple of weeks. Try to eat whole wheat breads and pasta, more fruits and vegetables, along with brown rice and plenty of fluids. Avoid small undigestible food items that could get stuck in these outpouchings, such as unpopped popcorn kernals, whole corn, small undigestible seeds, etc..    ACTIVITY:  Exercise is also a great way to prevent constipation and is encouraged. It may also help prevent progression of your diverticulosis. Always make sure you take in plenty of fluids when exercising. MEDICATIONS:  Take an over-the-counter fiber supplement as noted above twice daily. If your symptoms don't improve with fiber and dietary changes alone your physician may also recommend psyllium or methylcellulose as well.   If

## 2023-11-29 NOTE — ANESTHESIA PRE PROCEDURE
Department of Anesthesiology  Preprocedure Note       Name:  Coni Duran   Age:  70 y.o.  :  1960                                          MRN:  [id number]         Date:  2023      Surgeon: Taj West):  Yari Calderon MD    Procedure: Procedure(s):  COLONOSCOPY DIAGNOSTIC    Medications prior to admission:   Prior to Admission medications    Medication Sig Start Date End Date Taking?  Authorizing Provider   ibuprofen (ADVIL;MOTRIN) 600 MG tablet Take 1 tablet by mouth every 6 hours as needed for Pain    Les Neri MD   polyethylene glycol (GOLYTELY) 236 g solution Please follow instructions given to you by your provider  Patient not taking: Reported on 2023   Yari Calderon MD   bisacodyl 5 MG EC tablet Please follow instructions given to you by your provider  Patient not taking: Reported on 2023   Yari Calderon MD   predniSONE (DELTASONE) 20 MG tablet Uses as needed 23   Les Neri MD   fexofenadine (ALLEGRA) 180 MG tablet Take 1 tablet by mouth daily 10/30/23   Rakan Solano MD   lisinopril (PRINIVIL;ZESTRIL) 40 MG tablet Take 1 tablet by mouth daily 10/30/23   Rakan Solano MD   albuterol sulfate HFA (PROVENTIL;VENTOLIN;PROAIR) 108 (90 Base) MCG/ACT inhaler Inhale 2 puffs into the lungs every 6 hours as needed for Wheezing 10/30/23   Rakan Solano MD   furosemide (LASIX) 20 MG tablet Take 1 tablet by mouth 2 times daily 10/30/23   Rakan Solano MD   carvedilol (COREG) 3.125 MG tablet Take 1 tablet by mouth 2 times daily 10/30/23   Rakan Solano MD   fluticasone (FLONASE) 50 MCG/ACT nasal spray 1 spray by Each Nostril route daily Use for allergies  Patient not taking: Reported on 2023 10/4/23   Rakan Solano MD   fluticasone (FLONASE SENSIMIST) 27.5 MCG/SPRAY nasal spray 1 spray by Nasal route daily  Patient not taking: Reported on 2023   Les Neri MD       Current medications:    No current

## 2023-11-29 NOTE — OP NOTE
criteria is met . The prep was good. Findings:  Terminal ileum: normal    Cecum/Ascending colon: normal    Transverse colon: normal    Descending/Sigmoid colon: abnormal:   3 tiny sessile polyps in the sigmoid colon the largest was 9 mm were removed with cold snare    Diverticulosis    Rectum/Anus: examined in normal and retroflexed positions and was abnormal:   Large hemorrhoids        Withdrawal Time was (minutes): 10    The colon was decompressed and the scope was removed. The patient tolerated the procedure well.      Recommendations/Plan:   Lifestyle and dietary modifications as discussed  F/U Biopsies  F/U In OfficeNo  Discussed with the family  Repeat colonoscopy [id number]  Patient did have some vagal bradycardia 1 we were going through the hepatic flexure area, for which we will suggest to the primary care physician may be cardiac workup if indicated    Electronically signed by Will Walden MD  on 11/29/2023 at 10:52 AM
negative...

## 2023-11-29 NOTE — H&P
on 11/22/2023) 4000 mL 0    bisacodyl 5 MG EC tablet Please follow instructions given to you by your provider (Patient not taking: Reported on 11/22/2023) 4 tablet 0    predniSONE (DELTASONE) 20 MG tablet Uses as needed      fexofenadine (ALLEGRA) 180 MG tablet Take 1 tablet by mouth daily 30 tablet 2    lisinopril (PRINIVIL;ZESTRIL) 40 MG tablet Take 1 tablet by mouth daily 30 tablet 3    albuterol sulfate HFA (PROVENTIL;VENTOLIN;PROAIR) 108 (90 Base) MCG/ACT inhaler Inhale 2 puffs into the lungs every 6 hours as needed for Wheezing 18 g 3    furosemide (LASIX) 20 MG tablet Take 1 tablet by mouth 2 times daily 60 tablet 2    carvedilol (COREG) 3.125 MG tablet Take 1 tablet by mouth 2 times daily 60 tablet 3    fluticasone (FLONASE) 50 MCG/ACT nasal spray 1 spray by Each Nostril route daily Use for allergies (Patient not taking: Reported on 11/22/2023) 32 g 1    fluticasone (FLONASE SENSIMIST) 27.5 MCG/SPRAY nasal spray 1 spray by Nasal route daily (Patient not taking: Reported on 11/22/2023)       Notation: Above medications are not currently reconciled at time of signing this H&P note, to be reconciled in pre-op per RN. Review of Systems   Constitutional:  Negative for chills and fever. HENT:  Negative for dental problem, hearing loss and sore throat. Eyes:  Positive for visual disturbance (Glasses). Respiratory:  Negative for cough and shortness of breath. Cardiovascular:  Negative for chest pain and palpitations. Gastrointestinal:         See HPI   Genitourinary:  Negative for dysuria and hematuria. Musculoskeletal:  Positive for back pain. Negative for neck pain. Skin:  Negative for rash and wound. Neurological:  Negative for speech difficulty. Hematological:  Does not bruise/bleed easily. GENERAL PHYSICAL EXAM     Vitals: Review vitals per RN flowsheet. Physical Exam  Constitutional:       General: She is not in acute distress.      Appearance:

## 2023-11-30 ENCOUNTER — TELEPHONE (OUTPATIENT)
Dept: GASTROENTEROLOGY | Age: 63
End: 2023-11-30

## 2023-11-30 NOTE — TELEPHONE ENCOUNTER
Please notify PCP         Recommendations/Plan:   Lifestyle and dietary modifications as discussed  F/U Biopsies  F/U In OfficeNo  Discussed with the family  Repeat colonoscopy [id number]  Patient did have some vagal bradycardia 1 we were going through the hepatic flexure area, for which we will suggest to the primary care physician may be cardiac workup if indicated     Electronically signed by Janeen Tanner MD  on 11/29/2023 at 10:52 AM

## 2023-12-01 LAB — SURGICAL PATHOLOGY REPORT: NORMAL

## 2023-12-05 ENCOUNTER — TELEPHONE (OUTPATIENT)
Dept: GASTROENTEROLOGY | Age: 63
End: 2023-12-05

## 2023-12-05 LAB — CYTOLOGY REPORT: NORMAL

## 2023-12-05 NOTE — TELEPHONE ENCOUNTER
Writer called PCP Dr Goyo Andre office to inform Provider during colonoscopy patient did have some vagal bradycardia while going through the hepatic flexure area, per Dr. Fior Garcia. Dr Fior Garcia is suggesting a cardiac workup if indicated. Writer spoke with Aurea Stafford.

## 2023-12-07 ENCOUNTER — TELEPHONE (OUTPATIENT)
Dept: OBGYN CLINIC | Age: 63
End: 2023-12-07

## 2023-12-07 NOTE — TELEPHONE ENCOUNTER
Tried calling patient regarding results. No answer no voicemail set up. Pap smear ASCUS  +HPV other detected  AGE 70  Needs colposcopy scheduled.

## 2023-12-07 NOTE — TELEPHONE ENCOUNTER
----- Message from SHAY Simon CNP sent at 12/5/2023  3:25 PM EST -----  Pap smear ASCUS  +HPV other detected  AGE 63  Needs colposcopy scheduled.

## 2023-12-07 NOTE — TELEPHONE ENCOUNTER
Parminder Cuellar pt notified of results and recommendations. Pt to call back to schedule colposcopy due to being at work currently. Pap smear ASCUS  +HPV other detected  AGE 70  Needs colposcopy scheduled.

## 2023-12-07 NOTE — TELEPHONE ENCOUNTER
----- Message from SHAY West CNP sent at 12/5/2023  3:25 PM EST -----  Pap smear ASCUS  +HPV other detected  AGE 63  Needs colposcopy scheduled.

## 2023-12-11 DIAGNOSIS — R19.5 POSITIVE COLORECTAL CANCER SCREENING USING COLOGUARD TEST: ICD-10-CM

## 2023-12-30 ENCOUNTER — HOSPITAL ENCOUNTER (OUTPATIENT)
Age: 63
Discharge: HOME | End: 2023-12-30
Payer: MEDICARE

## 2023-12-30 DIAGNOSIS — R00.1 BRADYCARDIA: ICD-10-CM

## 2023-12-30 LAB
ANION GAP SERPL CALCULATED.3IONS-SCNC: 8 MMOL/L (ref 9–17)
BUN SERPL-MCNC: 14 MG/DL (ref 8–23)
CALCIUM SERPL-MCNC: 10 MG/DL (ref 8.6–10.4)
CHLORIDE SERPL-SCNC: 103 MMOL/L (ref 98–107)
CO2 SERPL-SCNC: 30 MMOL/L (ref 20–31)
CREAT SERPL-MCNC: 0.7 MG/DL (ref 0.5–0.9)
GFR SERPL CREATININE-BSD FRML MDRD: >60 ML/MIN/1.73M2
GLUCOSE SERPL-MCNC: 92 MG/DL (ref 70–99)
MAGNESIUM SERPL-MCNC: 2.1 MG/DL (ref 1.6–2.6)
POTASSIUM SERPL-SCNC: 3.7 MMOL/L (ref 3.7–5.3)
SODIUM SERPL-SCNC: 141 MMOL/L (ref 135–144)
TSH SERPL DL<=0.05 MIU/L-ACNC: 1.53 UIU/ML (ref 0.3–5)

## 2023-12-30 PROCEDURE — 83735 ASSAY OF MAGNESIUM: CPT

## 2023-12-30 PROCEDURE — 80048 BASIC METABOLIC PNL TOTAL CA: CPT

## 2023-12-30 PROCEDURE — 36415 COLL VENOUS BLD VENIPUNCTURE: CPT

## 2023-12-30 PROCEDURE — 84443 ASSAY THYROID STIM HORMONE: CPT

## 2024-01-12 ENCOUNTER — APPOINTMENT (OUTPATIENT)
Dept: CT IMAGING | Age: 64
End: 2024-01-12
Payer: MEDICARE

## 2024-01-12 ENCOUNTER — HOSPITAL ENCOUNTER (INPATIENT)
Age: 64
LOS: 7 days | Discharge: HOME | End: 2024-01-19
Attending: PSYCHIATRY & NEUROLOGY | Admitting: PSYCHIATRY & NEUROLOGY
Payer: MEDICARE

## 2024-01-12 ENCOUNTER — HOSPITAL ENCOUNTER (EMERGENCY)
Age: 64
Discharge: ANOTHER HOSPITAL | End: 2024-01-12
Attending: EMERGENCY MEDICINE
Payer: MEDICARE

## 2024-01-12 VITALS
SYSTOLIC BLOOD PRESSURE: 135 MMHG | BODY MASS INDEX: 29.23 KG/M2 | RESPIRATION RATE: 23 BRPM | WEIGHT: 165 LBS | HEIGHT: 63 IN | DIASTOLIC BLOOD PRESSURE: 65 MMHG | OXYGEN SATURATION: 96 % | TEMPERATURE: 98.2 F | HEART RATE: 86 BPM

## 2024-01-12 DIAGNOSIS — E04.1 THYROID NODULE INCIDENTALLY NOTED ON IMAGING STUDY: Primary | ICD-10-CM

## 2024-01-12 DIAGNOSIS — I72.9 ANEURYSM (HCC): Primary | ICD-10-CM

## 2024-01-12 PROBLEM — I67.1 CEREBRAL ANEURYSM: Status: ACTIVE | Noted: 2024-01-12

## 2024-01-12 PROBLEM — I67.1 INTRACRANIAL ANEURYSM: Status: ACTIVE | Noted: 2024-01-12

## 2024-01-12 LAB
ANION GAP SERPL CALCULATED.3IONS-SCNC: 9 MMOL/L (ref 9–17)
BASOPHILS # BLD: 0 K/UL (ref 0–0.2)
BASOPHILS NFR BLD: 0 % (ref 0–2)
BUN SERPL-MCNC: 15 MG/DL (ref 8–23)
CALCIUM SERPL-MCNC: 9.4 MG/DL (ref 8.6–10.4)
CHLORIDE SERPL-SCNC: 106 MMOL/L (ref 98–107)
CO2 SERPL-SCNC: 25 MMOL/L (ref 20–31)
CREAT SERPL-MCNC: 0.6 MG/DL (ref 0.5–0.9)
EKG ATRIAL RATE: 72 BPM
EKG P AXIS: 59 DEGREES
EKG P-R INTERVAL: 182 MS
EKG Q-T INTERVAL: 396 MS
EKG QRS DURATION: 80 MS
EKG QTC CALCULATION (BAZETT): 433 MS
EKG R AXIS: 30 DEGREES
EKG T AXIS: 54 DEGREES
EKG VENTRICULAR RATE: 72 BPM
EOSINOPHIL # BLD: 0 K/UL (ref 0–0.4)
EOSINOPHILS RELATIVE PERCENT: 0 % (ref 0–4)
ERYTHROCYTE [DISTWIDTH] IN BLOOD BY AUTOMATED COUNT: 13.5 % (ref 11.5–14.9)
GFR SERPL CREATININE-BSD FRML MDRD: >60 ML/MIN/1.73M2
GLUCOSE SERPL-MCNC: 131 MG/DL (ref 70–99)
HCT VFR BLD AUTO: 38.9 % (ref 36–46)
HGB BLD-MCNC: 13 G/DL (ref 12–16)
LYMPHOCYTES NFR BLD: 1.84 K/UL (ref 1–4.8)
LYMPHOCYTES RELATIVE PERCENT: 12 % (ref 24–44)
MCH RBC QN AUTO: 29.8 PG (ref 26–34)
MCHC RBC AUTO-ENTMCNC: 33.3 G/DL (ref 31–37)
MCV RBC AUTO: 89.4 FL (ref 80–100)
MONOCYTES NFR BLD: 0.92 K/UL (ref 0.1–1.3)
MONOCYTES NFR BLD: 6 % (ref 1–7)
MORPHOLOGY: NORMAL
NEUTROPHILS NFR BLD: 82 % (ref 36–66)
NEUTS SEG NFR BLD: 12.54 K/UL (ref 1.3–9.1)
PLATELET # BLD AUTO: 238 K/UL (ref 150–450)
PMV BLD AUTO: 6.9 FL (ref 6–12)
POTASSIUM SERPL-SCNC: 3.7 MMOL/L (ref 3.7–5.3)
RBC # BLD AUTO: 4.35 M/UL (ref 4–5.2)
SODIUM SERPL-SCNC: 140 MMOL/L (ref 135–144)
TROPONIN I SERPL HS-MCNC: <6 NG/L (ref 0–14)
WBC OTHER # BLD: 15.3 K/UL (ref 3.5–11)

## 2024-01-12 PROCEDURE — 84484 ASSAY OF TROPONIN QUANT: CPT

## 2024-01-12 PROCEDURE — 85025 COMPLETE CBC W/AUTO DIFF WBC: CPT

## 2024-01-12 PROCEDURE — 70450 CT HEAD/BRAIN W/O DYE: CPT

## 2024-01-12 PROCEDURE — 6370000000 HC RX 637 (ALT 250 FOR IP): Performed by: EMERGENCY MEDICINE

## 2024-01-12 PROCEDURE — 99285 EMERGENCY DEPT VISIT HI MDM: CPT

## 2024-01-12 PROCEDURE — 2580000003 HC RX 258: Performed by: EMERGENCY MEDICINE

## 2024-01-12 PROCEDURE — 96365 THER/PROPH/DIAG IV INF INIT: CPT

## 2024-01-12 PROCEDURE — 70498 CT ANGIOGRAPHY NECK: CPT

## 2024-01-12 PROCEDURE — 6360000002 HC RX W HCPCS: Performed by: EMERGENCY MEDICINE

## 2024-01-12 PROCEDURE — 36415 COLL VENOUS BLD VENIPUNCTURE: CPT

## 2024-01-12 PROCEDURE — 6360000004 HC RX CONTRAST MEDICATION: Performed by: EMERGENCY MEDICINE

## 2024-01-12 PROCEDURE — 96367 TX/PROPH/DG ADDL SEQ IV INF: CPT

## 2024-01-12 PROCEDURE — 2000000000 HC ICU R&B

## 2024-01-12 PROCEDURE — 80048 BASIC METABOLIC PNL TOTAL CA: CPT

## 2024-01-12 RX ORDER — NIMODIPINE 30 MG/1
60 CAPSULE, LIQUID FILLED ORAL
Status: DISCONTINUED | OUTPATIENT
Start: 2024-01-13 | End: 2024-01-19 | Stop reason: HOSPADM

## 2024-01-12 RX ORDER — NICARDIPINE HYDROCHLORIDE 0.1 MG/ML
2.5-15 INJECTION INTRAVENOUS CONTINUOUS
Status: DISCONTINUED | OUTPATIENT
Start: 2024-01-13 | End: 2024-01-15

## 2024-01-12 RX ORDER — ACETAMINOPHEN 325 MG/1
650 TABLET ORAL EVERY 4 HOURS PRN
Status: DISCONTINUED | OUTPATIENT
Start: 2024-01-12 | End: 2024-01-15 | Stop reason: SDUPTHER

## 2024-01-12 RX ORDER — NIMODIPINE 30 MG/1
60 CAPSULE, LIQUID FILLED ORAL
Status: DISCONTINUED | OUTPATIENT
Start: 2024-01-12 | End: 2024-01-12 | Stop reason: HOSPADM

## 2024-01-12 RX ORDER — ONDANSETRON 2 MG/ML
4 INJECTION INTRAMUSCULAR; INTRAVENOUS EVERY 6 HOURS PRN
Status: DISCONTINUED | OUTPATIENT
Start: 2024-01-12 | End: 2024-01-15 | Stop reason: SDUPTHER

## 2024-01-12 RX ORDER — SODIUM CHLORIDE 9 MG/ML
INJECTION, SOLUTION INTRAVENOUS PRN
Status: DISCONTINUED | OUTPATIENT
Start: 2024-01-12 | End: 2024-01-16

## 2024-01-12 RX ORDER — SODIUM CHLORIDE 0.9 % (FLUSH) 0.9 %
10 SYRINGE (ML) INJECTION PRN
Status: COMPLETED | OUTPATIENT
Start: 2024-01-12 | End: 2024-01-12

## 2024-01-12 RX ORDER — ONDANSETRON 4 MG/1
4 TABLET, ORALLY DISINTEGRATING ORAL EVERY 8 HOURS PRN
Status: DISCONTINUED | OUTPATIENT
Start: 2024-01-12 | End: 2024-01-15 | Stop reason: SDUPTHER

## 2024-01-12 RX ORDER — CARVEDILOL 3.12 MG/1
3.12 TABLET ORAL 2 TIMES DAILY WITH MEALS
Status: DISCONTINUED | OUTPATIENT
Start: 2024-01-12 | End: 2024-01-12 | Stop reason: HOSPADM

## 2024-01-12 RX ORDER — SODIUM CHLORIDE 9 MG/ML
INJECTION, SOLUTION INTRAVENOUS ONCE
Status: COMPLETED | OUTPATIENT
Start: 2024-01-12 | End: 2024-01-12

## 2024-01-12 RX ORDER — SODIUM CHLORIDE 0.9 % (FLUSH) 0.9 %
5-40 SYRINGE (ML) INJECTION PRN
Status: DISCONTINUED | OUTPATIENT
Start: 2024-01-12 | End: 2024-01-16

## 2024-01-12 RX ORDER — SODIUM CHLORIDE 9 MG/ML
INJECTION, SOLUTION INTRAVENOUS CONTINUOUS
Status: DISCONTINUED | OUTPATIENT
Start: 2024-01-13 | End: 2024-01-15

## 2024-01-12 RX ORDER — LEVETIRACETAM 5 MG/ML
500 INJECTION INTRAVASCULAR EVERY 12 HOURS
Status: DISCONTINUED | OUTPATIENT
Start: 2024-01-13 | End: 2024-01-13

## 2024-01-12 RX ORDER — MAGNESIUM SULFATE 1 G/100ML
1000 INJECTION INTRAVENOUS PRN
Status: DISCONTINUED | OUTPATIENT
Start: 2024-01-12 | End: 2024-01-19 | Stop reason: HOSPADM

## 2024-01-12 RX ORDER — SODIUM CHLORIDE 0.9 % (FLUSH) 0.9 %
5-40 SYRINGE (ML) INJECTION EVERY 12 HOURS SCHEDULED
Status: DISCONTINUED | OUTPATIENT
Start: 2024-01-13 | End: 2024-01-16

## 2024-01-12 RX ADMIN — IOPAMIDOL 75 ML: 755 INJECTION, SOLUTION INTRAVENOUS at 18:54

## 2024-01-12 RX ADMIN — NIMODIPINE 60 MG: 30 CAPSULE, LIQUID FILLED ORAL at 21:24

## 2024-01-12 RX ADMIN — SODIUM CHLORIDE 5 MG/HR: 9 INJECTION, SOLUTION INTRAVENOUS at 22:18

## 2024-01-12 RX ADMIN — SODIUM CHLORIDE, PRESERVATIVE FREE 10 ML: 5 INJECTION INTRAVENOUS at 18:54

## 2024-01-12 RX ADMIN — SODIUM CHLORIDE 1000 MG: 9 INJECTION, SOLUTION INTRAVENOUS at 21:38

## 2024-01-12 RX ADMIN — SODIUM CHLORIDE: 9 INJECTION, SOLUTION INTRAVENOUS at 18:57

## 2024-01-12 RX ADMIN — CARVEDILOL 3.12 MG: 3.12 TABLET, FILM COATED ORAL at 18:58

## 2024-01-12 ASSESSMENT — ENCOUNTER SYMPTOMS
EYE REDNESS: 0
SORE THROAT: 0
DIARRHEA: 0
COUGH: 0
CONSTIPATION: 0
SHORTNESS OF BREATH: 0
NAUSEA: 0
EYE PAIN: 0
VOMITING: 0
BACK PAIN: 0
CHEST TIGHTNESS: 0
ABDOMINAL PAIN: 0

## 2024-01-12 ASSESSMENT — PAIN SCALES - GENERAL: PAINLEVEL_OUTOF10: 5

## 2024-01-12 ASSESSMENT — PAIN DESCRIPTION - ORIENTATION: ORIENTATION: POSTERIOR

## 2024-01-12 ASSESSMENT — PAIN - FUNCTIONAL ASSESSMENT: PAIN_FUNCTIONAL_ASSESSMENT: 0-10

## 2024-01-12 ASSESSMENT — LIFESTYLE VARIABLES
HOW MANY STANDARD DRINKS CONTAINING ALCOHOL DO YOU HAVE ON A TYPICAL DAY: PATIENT DOES NOT DRINK
HOW OFTEN DO YOU HAVE A DRINK CONTAINING ALCOHOL: NEVER

## 2024-01-12 ASSESSMENT — PAIN DESCRIPTION - PAIN TYPE: TYPE: ACUTE PAIN

## 2024-01-12 ASSESSMENT — PAIN DESCRIPTION - LOCATION: LOCATION: HEAD

## 2024-01-12 NOTE — ED PROVIDER NOTES
Reviewed   BASIC METABOLIC PANEL - Abnormal; Notable for the following components:       Result Value    Glucose 131 (*)     All other components within normal limits   CBC WITH AUTO DIFFERENTIAL - Abnormal; Notable for the following components:    WBC 15.3 (*)     Neutrophils % 82 (*)     Lymphocytes % 12 (*)     Neutrophils Absolute 12.54 (*)     All other components within normal limits   TROPONIN     EMERGENCY DEPARTMENT COURSE:   Vitals:    Vitals:    01/12/24 2030 01/12/24 2045 01/12/24 2100 01/12/24 2124   BP: 134/74  (!) 115/58 (!) 170/80   Pulse: (!) 0 (!) 0     Resp: 15 (!) 45 (!) 37    Temp:       TempSrc:       SpO2: 94% 96% 94%    Weight:       Height:           The patient was given the following medications while in the emergency department:  Orders Placed This Encounter   Medications    carvedilol (COREG) tablet 3.125 mg    iopamidol (ISOVUE-370) 76 % injection 75 mL    0.9 % sodium chloride infusion    sodium chloride flush 0.9 % injection 10 mL    niMODipine (NIMOTOP) capsule 60 mg    niCARdipine (CARDENE) 25 mg in sodium chloride 0.9 % 250 mL infusion     Order Specific Question:   Titrate Infusion?     Answer:   Yes     Order Specific Question:   Initial Infusion Rate:     Answer:   5 mg/hr     Order Specific Question:   Goal of Therapy is:     Answer:   Other     Order Specific Question:   Other Goal:     Answer:   SBP goal      Order Specific Question:   Contact Provider if:     Answer:   Patient is receiving the maximum dose and is not achieving the goal of therapy    levETIRAcetam (KEPPRA) 1,000 mg in sodium chloride 0.9 % 100 mL IVPB     CONSULTS:  IP CONSULT TO NEUROLOGY    FINAL IMPRESSION      1. Thyroid nodule incidentally noted on imaging study          DISPOSITION/PLAN   DISPOSITION Decision To Transfer 01/12/2024 09:11:36 PM      PATIENT REFERRED TO:  Abel Cuenca MD  [address] [id number]  [phone]          DISCHARGE MEDICATIONS:  New Prescriptions    No

## 2024-01-12 NOTE — ED TRIAGE NOTES
Mode of arrival (squad #, walk in, police, etc) : walk in         Chief complaint(s): HTN        Arrival Note (brief scenario, treatment PTA, etc).: has has increased BP xs 2 days- seen at other ED yesterday and also had rash- given steroids. PT has headaches.  PCP changed BP med 2 months ago.         C= \"Have you ever felt that you should Cut down on your drinking?\"  No  A= \"Have people Annoyed you by criticizing your drinking?\"  No  G= \"Have you ever felt bad or Guilty about your drinking?\"  No  E= \"Have you ever had a drink as an Eye-opener first thing in the morning to steady your nerves or to help a hangover?\"  No      Deferred []      Reason for deferring: N/A    *If yes to two or more: probable alcohol abuse.*

## 2024-01-13 ENCOUNTER — APPOINTMENT (OUTPATIENT)
Dept: MRI IMAGING | Age: 64
End: 2024-01-13
Attending: PSYCHIATRY & NEUROLOGY
Payer: MEDICARE

## 2024-01-13 ENCOUNTER — APPOINTMENT (OUTPATIENT)
Dept: INTERVENTIONAL RADIOLOGY/VASCULAR | Age: 64
End: 2024-01-13
Attending: PSYCHIATRY & NEUROLOGY
Payer: MEDICARE

## 2024-01-13 ENCOUNTER — ANESTHESIA (OUTPATIENT)
Dept: INTERVENTIONAL RADIOLOGY/VASCULAR | Age: 64
End: 2024-01-13
Payer: MEDICARE

## 2024-01-13 ENCOUNTER — ANESTHESIA EVENT (OUTPATIENT)
Dept: INTERVENTIONAL RADIOLOGY/VASCULAR | Age: 64
End: 2024-01-13
Payer: MEDICARE

## 2024-01-13 PROBLEM — I72.9 ANEURYSM (HCC): Status: ACTIVE | Noted: 2024-01-13

## 2024-01-13 LAB
ACT BLD: 143 SEC (ref 79–149)
ACT BLD: 185 SEC (ref 79–149)
ACT BLD: 189 SEC (ref 79–149)
ANION GAP SERPL CALCULATED.3IONS-SCNC: 9 MMOL/L (ref 9–17)
BACTERIA URNS QL MICRO: ABNORMAL
BILIRUB UR QL STRIP: NEGATIVE
BUN SERPL-MCNC: 12 MG/DL (ref 8–23)
CALCIUM SERPL-MCNC: 8.4 MG/DL (ref 8.6–10.4)
CASTS #/AREA URNS LPF: ABNORMAL /LPF (ref 0–8)
CHLORIDE SERPL-SCNC: 108 MMOL/L (ref 98–107)
CHOLEST SERPL-MCNC: 163 MG/DL
CHOLESTEROL/HDL RATIO: 2.5
CLARITY UR: CLEAR
CO2 SERPL-SCNC: 24 MMOL/L (ref 20–31)
COLOR UR: YELLOW
CREAT SERPL-MCNC: 0.6 MG/DL (ref 0.5–0.9)
EPI CELLS #/AREA URNS HPF: ABNORMAL /HPF (ref 0–5)
ERYTHROCYTE [DISTWIDTH] IN BLOOD BY AUTOMATED COUNT: 13.1 % (ref 11.8–14.4)
GFR SERPL CREATININE-BSD FRML MDRD: >60 ML/MIN/1.73M2
GLUCOSE SERPL-MCNC: 91 MG/DL (ref 70–99)
GLUCOSE UR STRIP-MCNC: NEGATIVE MG/DL
HCT VFR BLD AUTO: 38.9 % (ref 36.3–47.1)
HDLC SERPL-MCNC: 65 MG/DL
HGB BLD-MCNC: 12.7 G/DL (ref 11.9–15.1)
HGB UR QL STRIP.AUTO: ABNORMAL
KETONES UR STRIP-MCNC: NEGATIVE MG/DL
LDLC SERPL CALC-MCNC: 72 MG/DL (ref 0–130)
LEUKOCYTE ESTERASE UR QL STRIP: ABNORMAL
MAGNESIUM SERPL-MCNC: 2.2 MG/DL (ref 1.6–2.6)
MCH RBC QN AUTO: 30.1 PG (ref 25.2–33.5)
MCHC RBC AUTO-ENTMCNC: 32.6 G/DL (ref 28.4–34.8)
MCV RBC AUTO: 92.2 FL (ref 82.6–102.9)
NITRITE UR QL STRIP: NEGATIVE
NRBC BLD-RTO: 0 PER 100 WBC
PH UR STRIP: 7 [PH] (ref 5–8)
PLATELET # BLD AUTO: 209 K/UL (ref 138–453)
PMV BLD AUTO: 9.1 FL (ref 8.1–13.5)
POTASSIUM SERPL-SCNC: 3.4 MMOL/L (ref 3.7–5.3)
PROT UR STRIP-MCNC: NEGATIVE MG/DL
RBC # BLD AUTO: 4.22 M/UL (ref 3.95–5.11)
RBC #/AREA URNS HPF: ABNORMAL /HPF (ref 0–4)
SODIUM SERPL-SCNC: 141 MMOL/L (ref 135–144)
SP GR UR STRIP: 1.01 (ref 1–1.03)
TRIGL SERPL-MCNC: 130 MG/DL
UROBILINOGEN UR STRIP-ACNC: NORMAL EU/DL (ref 0–1)
WBC #/AREA URNS HPF: ABNORMAL /HPF (ref 0–5)
WBC OTHER # BLD: 7.7 K/UL (ref 3.5–11.3)

## 2024-01-13 PROCEDURE — 03VG3DZ RESTRICTION OF INTRACRANIAL ARTERY WITH INTRALUMINAL DEVICE, PERCUTANEOUS APPROACH: ICD-10-PCS | Performed by: PSYCHIATRY & NEUROLOGY

## 2024-01-13 PROCEDURE — 81001 URINALYSIS AUTO W/SCOPE: CPT

## 2024-01-13 PROCEDURE — 6360000004 HC RX CONTRAST MEDICATION: Performed by: PSYCHIATRY & NEUROLOGY

## 2024-01-13 PROCEDURE — 6370000000 HC RX 637 (ALT 250 FOR IP): Performed by: STUDENT IN AN ORGANIZED HEALTH CARE EDUCATION/TRAINING PROGRAM

## 2024-01-13 PROCEDURE — 6360000002 HC RX W HCPCS: Performed by: NURSE ANESTHETIST, CERTIFIED REGISTERED

## 2024-01-13 PROCEDURE — 83036 HEMOGLOBIN GLYCOSYLATED A1C: CPT

## 2024-01-13 PROCEDURE — 7100000001 HC PACU RECOVERY - ADDTL 15 MIN

## 2024-01-13 PROCEDURE — 6360000002 HC RX W HCPCS: Performed by: STUDENT IN AN ORGANIZED HEALTH CARE EDUCATION/TRAINING PROGRAM

## 2024-01-13 PROCEDURE — 76377 3D RENDER W/INTRP POSTPROCES: CPT

## 2024-01-13 PROCEDURE — B316YZZ FLUOROSCOPY OF RIGHT INTERNAL CAROTID ARTERY USING OTHER CONTRAST: ICD-10-PCS | Performed by: PSYCHIATRY & NEUROLOGY

## 2024-01-13 PROCEDURE — 70551 MRI BRAIN STEM W/O DYE: CPT

## 2024-01-13 PROCEDURE — 61624 TCAT PERM OCCLS/EMBOLJ CNS: CPT

## 2024-01-13 PROCEDURE — 3700000001 HC ADD 15 MINUTES (ANESTHESIA)

## 2024-01-13 PROCEDURE — 6370000000 HC RX 637 (ALT 250 FOR IP): Performed by: REGISTERED NURSE

## 2024-01-13 PROCEDURE — 99223 1ST HOSP IP/OBS HIGH 75: CPT | Performed by: PSYCHIATRY & NEUROLOGY

## 2024-01-13 PROCEDURE — B41FYZZ FLUOROSCOPY OF RIGHT LOWER EXTREMITY ARTERIES USING OTHER CONTRAST: ICD-10-PCS | Performed by: PSYCHIATRY & NEUROLOGY

## 2024-01-13 PROCEDURE — 75894 X-RAYS TRANSCATH THERAPY: CPT

## 2024-01-13 PROCEDURE — 80048 BASIC METABOLIC PNL TOTAL CA: CPT

## 2024-01-13 PROCEDURE — 99222 1ST HOSP IP/OBS MODERATE 55: CPT | Performed by: NEUROLOGICAL SURGERY

## 2024-01-13 PROCEDURE — 2000000000 HC ICU R&B

## 2024-01-13 PROCEDURE — 6360000002 HC RX W HCPCS: Performed by: REGISTERED NURSE

## 2024-01-13 PROCEDURE — 83735 ASSAY OF MAGNESIUM: CPT

## 2024-01-13 PROCEDURE — 85027 COMPLETE CBC AUTOMATED: CPT

## 2024-01-13 PROCEDURE — 36224 PLACE CATH CAROTD ART: CPT

## 2024-01-13 PROCEDURE — C1769 GUIDE WIRE: HCPCS

## 2024-01-13 PROCEDURE — 36228 PLACE CATH INTRACRANIAL ART: CPT

## 2024-01-13 PROCEDURE — 75898 FOLLOW-UP ANGIOGRAPHY: CPT

## 2024-01-13 PROCEDURE — B313YZZ FLUOROSCOPY OF RIGHT COMMON CAROTID ARTERY USING OTHER CONTRAST: ICD-10-PCS | Performed by: PSYCHIATRY & NEUROLOGY

## 2024-01-13 PROCEDURE — 7100000000 HC PACU RECOVERY - FIRST 15 MIN

## 2024-01-13 PROCEDURE — 92523 SPEECH SOUND LANG COMPREHEN: CPT

## 2024-01-13 PROCEDURE — 2580000003 HC RX 258: Performed by: REGISTERED NURSE

## 2024-01-13 PROCEDURE — 85347 COAGULATION TIME ACTIVATED: CPT

## 2024-01-13 PROCEDURE — 2500000003 HC RX 250 WO HCPCS: Performed by: NURSE ANESTHETIST, CERTIFIED REGISTERED

## 2024-01-13 PROCEDURE — 36415 COLL VENOUS BLD VENIPUNCTURE: CPT

## 2024-01-13 PROCEDURE — 80061 LIPID PANEL: CPT

## 2024-01-13 PROCEDURE — 3700000000 HC ANESTHESIA ATTENDED CARE

## 2024-01-13 PROCEDURE — C1889 IMPLANT/INSERT DEVICE, NOC: HCPCS

## 2024-01-13 PROCEDURE — 2580000003 HC RX 258: Performed by: NURSE ANESTHETIST, CERTIFIED REGISTERED

## 2024-01-13 RX ORDER — ACETAMINOPHEN 325 MG/1
650 TABLET ORAL EVERY 4 HOURS PRN
Status: DISCONTINUED | OUTPATIENT
Start: 2024-01-13 | End: 2024-01-19 | Stop reason: HOSPADM

## 2024-01-13 RX ORDER — OXYCODONE HYDROCHLORIDE AND ACETAMINOPHEN 5; 325 MG/1; MG/1
1 TABLET ORAL
Status: COMPLETED | OUTPATIENT
Start: 2024-01-13 | End: 2024-01-14

## 2024-01-13 RX ORDER — SODIUM CHLORIDE, SODIUM LACTATE, POTASSIUM CHLORIDE, CALCIUM CHLORIDE 600; 310; 30; 20 MG/100ML; MG/100ML; MG/100ML; MG/100ML
INJECTION, SOLUTION INTRAVENOUS CONTINUOUS PRN
Status: DISCONTINUED | OUTPATIENT
Start: 2024-01-13 | End: 2024-01-13 | Stop reason: SDUPTHER

## 2024-01-13 RX ORDER — SODIUM CHLORIDE 9 MG/ML
INJECTION, SOLUTION INTRAVENOUS PRN
OUTPATIENT
Start: 2024-01-13

## 2024-01-13 RX ORDER — LIDOCAINE HYDROCHLORIDE 10 MG/ML
INJECTION, SOLUTION EPIDURAL; INFILTRATION; INTRACAUDAL; PERINEURAL PRN
Status: DISCONTINUED | OUTPATIENT
Start: 2024-01-13 | End: 2024-01-13 | Stop reason: SDUPTHER

## 2024-01-13 RX ORDER — NICARDIPINE HYDROCHLORIDE 0.1 MG/ML
2.5-15 INJECTION INTRAVENOUS CONTINUOUS
Status: DISCONTINUED | OUTPATIENT
Start: 2024-01-13 | End: 2024-01-15

## 2024-01-13 RX ORDER — ONDANSETRON 4 MG/1
4 TABLET, ORALLY DISINTEGRATING ORAL EVERY 8 HOURS PRN
Status: DISCONTINUED | OUTPATIENT
Start: 2024-01-13 | End: 2024-01-19 | Stop reason: HOSPADM

## 2024-01-13 RX ORDER — FENTANYL CITRATE 50 UG/ML
25 INJECTION, SOLUTION INTRAMUSCULAR; INTRAVENOUS EVERY 5 MIN PRN
OUTPATIENT
Start: 2024-01-13

## 2024-01-13 RX ORDER — SODIUM CHLORIDE 0.9 % (FLUSH) 0.9 %
5-40 SYRINGE (ML) INJECTION PRN
OUTPATIENT
Start: 2024-01-13

## 2024-01-13 RX ORDER — PROPOFOL 10 MG/ML
INJECTION, EMULSION INTRAVENOUS PRN
Status: DISCONTINUED | OUTPATIENT
Start: 2024-01-13 | End: 2024-01-13 | Stop reason: SDUPTHER

## 2024-01-13 RX ORDER — HEPARIN SODIUM 1000 [USP'U]/ML
INJECTION, SOLUTION INTRAVENOUS; SUBCUTANEOUS PRN
Status: DISCONTINUED | OUTPATIENT
Start: 2024-01-13 | End: 2024-01-13 | Stop reason: SDUPTHER

## 2024-01-13 RX ORDER — LABETALOL HYDROCHLORIDE 5 MG/ML
5 INJECTION, SOLUTION INTRAVENOUS
Status: DISCONTINUED | OUTPATIENT
Start: 2024-01-13 | End: 2024-01-15

## 2024-01-13 RX ORDER — ONDANSETRON 2 MG/ML
4 INJECTION INTRAMUSCULAR; INTRAVENOUS
OUTPATIENT
Start: 2024-01-13 | End: 2024-01-14

## 2024-01-13 RX ORDER — IODIXANOL 270 MG/ML
100 INJECTION, SOLUTION INTRAVASCULAR
Status: COMPLETED | OUTPATIENT
Start: 2024-01-13 | End: 2024-01-13

## 2024-01-13 RX ORDER — 0.9 % SODIUM CHLORIDE 0.9 %
500 INTRAVENOUS SOLUTION INTRAVENOUS ONCE
Status: COMPLETED | OUTPATIENT
Start: 2024-01-13 | End: 2024-01-13

## 2024-01-13 RX ORDER — CEFAZOLIN SODIUM 1 G/3ML
INJECTION, POWDER, FOR SOLUTION INTRAMUSCULAR; INTRAVENOUS PRN
Status: DISCONTINUED | OUTPATIENT
Start: 2024-01-13 | End: 2024-01-13 | Stop reason: SDUPTHER

## 2024-01-13 RX ORDER — FENTANYL CITRATE 50 UG/ML
INJECTION, SOLUTION INTRAMUSCULAR; INTRAVENOUS PRN
Status: DISCONTINUED | OUTPATIENT
Start: 2024-01-13 | End: 2024-01-13 | Stop reason: SDUPTHER

## 2024-01-13 RX ORDER — SODIUM CHLORIDE 0.9 % (FLUSH) 0.9 %
5-40 SYRINGE (ML) INJECTION EVERY 12 HOURS SCHEDULED
Status: DISCONTINUED | OUTPATIENT
Start: 2024-01-13 | End: 2024-01-19 | Stop reason: HOSPADM

## 2024-01-13 RX ORDER — SODIUM CHLORIDE 9 MG/ML
INJECTION, SOLUTION INTRAVENOUS PRN
Status: DISCONTINUED | OUTPATIENT
Start: 2024-01-13 | End: 2024-01-19 | Stop reason: HOSPADM

## 2024-01-13 RX ORDER — FENTANYL CITRATE 50 UG/ML
50 INJECTION, SOLUTION INTRAMUSCULAR; INTRAVENOUS EVERY 5 MIN PRN
OUTPATIENT
Start: 2024-01-13

## 2024-01-13 RX ORDER — ONDANSETRON 2 MG/ML
4 INJECTION INTRAMUSCULAR; INTRAVENOUS EVERY 6 HOURS PRN
Status: DISCONTINUED | OUTPATIENT
Start: 2024-01-13 | End: 2024-01-19 | Stop reason: HOSPADM

## 2024-01-13 RX ORDER — SODIUM CHLORIDE 0.9 % (FLUSH) 0.9 %
5-40 SYRINGE (ML) INJECTION EVERY 12 HOURS SCHEDULED
OUTPATIENT
Start: 2024-01-13

## 2024-01-13 RX ORDER — ROCURONIUM BROMIDE 10 MG/ML
INJECTION, SOLUTION INTRAVENOUS PRN
Status: DISCONTINUED | OUTPATIENT
Start: 2024-01-13 | End: 2024-01-13 | Stop reason: SDUPTHER

## 2024-01-13 RX ORDER — SODIUM CHLORIDE 0.9 % (FLUSH) 0.9 %
5-40 SYRINGE (ML) INJECTION PRN
Status: DISCONTINUED | OUTPATIENT
Start: 2024-01-13 | End: 2024-01-19 | Stop reason: HOSPADM

## 2024-01-13 RX ADMIN — PHENYLEPHRINE HYDROCHLORIDE 50 MCG/MIN: 10 INJECTION INTRAVENOUS at 16:57

## 2024-01-13 RX ADMIN — NICARDIPINE HYDROCHLORIDE 12.5 MG/HR: 0.1 INJECTION INTRAVENOUS at 00:34

## 2024-01-13 RX ADMIN — NIMODIPINE 60 MG: 30 CAPSULE, LIQUID FILLED ORAL at 04:30

## 2024-01-13 RX ADMIN — FENTANYL CITRATE 50 MCG: 50 INJECTION, SOLUTION INTRAMUSCULAR; INTRAVENOUS at 16:06

## 2024-01-13 RX ADMIN — NIMODIPINE 60 MG: 30 CAPSULE, LIQUID FILLED ORAL at 23:55

## 2024-01-13 RX ADMIN — HEPARIN SODIUM 1000 UNITS: 1000 INJECTION INTRAVENOUS; SUBCUTANEOUS at 18:11

## 2024-01-13 RX ADMIN — PHENYLEPHRINE HYDROCHLORIDE 200 MCG: 10 INJECTION INTRAVENOUS at 16:39

## 2024-01-13 RX ADMIN — IODIXANOL 95 ML: 270 INJECTION, SOLUTION INTRAVASCULAR at 18:31

## 2024-01-13 RX ADMIN — NIMODIPINE 60 MG: 30 CAPSULE, LIQUID FILLED ORAL at 11:31

## 2024-01-13 RX ADMIN — NICARDIPINE HYDROCHLORIDE 5 MG/HR: 0.1 INJECTION INTRAVENOUS at 20:05

## 2024-01-13 RX ADMIN — PHENYLEPHRINE HYDROCHLORIDE 100 MCG: 10 INJECTION INTRAVENOUS at 16:18

## 2024-01-13 RX ADMIN — NIMODIPINE 60 MG: 30 CAPSULE, LIQUID FILLED ORAL at 00:15

## 2024-01-13 RX ADMIN — SODIUM CHLORIDE, SODIUM LACTATE, POTASSIUM CHLORIDE, CALCIUM CHLORIDE: 600; 310; 30; 20 INJECTION, SOLUTION INTRAVENOUS at 17:05

## 2024-01-13 RX ADMIN — SUGAMMADEX 200 MG: 100 INJECTION, SOLUTION INTRAVENOUS at 18:23

## 2024-01-13 RX ADMIN — PROPOFOL 150 MG: 10 INJECTION, EMULSION INTRAVENOUS at 16:15

## 2024-01-13 RX ADMIN — NIMODIPINE 60 MG: 30 CAPSULE, LIQUID FILLED ORAL at 20:13

## 2024-01-13 RX ADMIN — NIMODIPINE 60 MG: 30 CAPSULE, LIQUID FILLED ORAL at 09:04

## 2024-01-13 RX ADMIN — SODIUM CHLORIDE 500 ML: 0.9 INJECTION, SOLUTION INTRAVENOUS at 02:30

## 2024-01-13 RX ADMIN — ACETAMINOPHEN 650 MG: 325 TABLET ORAL at 22:30

## 2024-01-13 RX ADMIN — CEFAZOLIN 2 G: 1 INJECTION, POWDER, FOR SOLUTION INTRAMUSCULAR; INTRAVENOUS at 16:35

## 2024-01-13 RX ADMIN — PHENYLEPHRINE HYDROCHLORIDE 150 MCG: 10 INJECTION INTRAVENOUS at 16:47

## 2024-01-13 RX ADMIN — PHENYLEPHRINE HYDROCHLORIDE 50 MCG: 10 INJECTION INTRAVENOUS at 16:59

## 2024-01-13 RX ADMIN — PHENYLEPHRINE HYDROCHLORIDE 40 MCG/MIN: 10 INJECTION INTRAVENOUS at 17:12

## 2024-01-13 RX ADMIN — HEPARIN SODIUM 3000 UNITS: 1000 INJECTION INTRAVENOUS; SUBCUTANEOUS at 17:02

## 2024-01-13 RX ADMIN — SODIUM CHLORIDE, POTASSIUM CHLORIDE, SODIUM LACTATE AND CALCIUM CHLORIDE: 600; 310; 30; 20 INJECTION, SOLUTION INTRAVENOUS at 16:37

## 2024-01-13 RX ADMIN — LIDOCAINE HYDROCHLORIDE 50 MG: 10 INJECTION, SOLUTION EPIDURAL; INFILTRATION; INTRACAUDAL; PERINEURAL at 16:15

## 2024-01-13 RX ADMIN — PHENYLEPHRINE HYDROCHLORIDE 150 MCG: 10 INJECTION INTRAVENOUS at 16:28

## 2024-01-13 RX ADMIN — SODIUM CHLORIDE: 9 INJECTION, SOLUTION INTRAVENOUS at 00:35

## 2024-01-13 RX ADMIN — HEPARIN SODIUM 1000 UNITS: 1000 INJECTION INTRAVENOUS; SUBCUTANEOUS at 17:30

## 2024-01-13 RX ADMIN — ROCURONIUM BROMIDE 50 MG: 10 INJECTION, SOLUTION INTRAVENOUS at 16:15

## 2024-01-13 RX ADMIN — PHENYLEPHRINE HYDROCHLORIDE 200 MCG: 10 INJECTION INTRAVENOUS at 16:31

## 2024-01-13 RX ADMIN — LEVETIRACETAM 500 MG: 5 INJECTION, SOLUTION INTRAVENOUS at 01:30

## 2024-01-13 ASSESSMENT — PAIN DESCRIPTION - LOCATION: LOCATION: BACK;HEAD

## 2024-01-13 NOTE — H&P
Neuro ICU History & Physical    Patient Name: Miladys Baker  Patient : 1960  Room/Bed: 0128/0128-01  Code Status: Full   Allergies: No Known Allergies    CHIEF COMPLAINT     Headache, Right MCA aneurysm     HPI    History Obtained From: Patient and electronic medical record     The patient is a 63 y.o. female significant past medical history of TIA in  in [place] patient reports for approximately 2 weeks after she had residual left facial weakness that is resolved.  Hypertension for which patient takes lisinopril she supposed to be taking Coreg but stopped taking it secondary to it causing her nausea.  On 2024 patient went to an urgent care for a rash she said when they took her blood pressure it was 187/78.  While she was at work today she started having a headache that was not relieved by Tylenol she felt short of breath and flush and when she checked her blood pressure at that time it was 200/97.  This prompted her to go to the emergency department at [facility].  Initial pressure at [facility] was 134/74.  CT/CTA of the head was performed that demonstrated a right MCA 1.8 x 0.8x 0.6 cm narrow necked lobulated saccular aneurysm.  Patient was transferred to [place] for neuroendovascular and neurosurgical evaluation.    On exam patient is neurologically intact no focal deficit.  Neurosurgery was contacted and DSA tomorrow was recommended, if DSA shows easily coilable aneurysm as it appears  is appears on CTA it should be coiled.     Admitted to ICU From: ED   Reason for ICU Admission: Close neuro monitoring,  blood pressure control in the setting of unsecured R MCA aneurysm        PATIENT HISTORY   Past Medical History:        Diagnosis Date    Allergies     Asthma     Endometriosis     Hypertension     Skin cancer     on nose    Stroke (HCC)            Past Surgical History:        Procedure Laterality Date    BREAST CYST EXCISION Right      SECTION  1980     Stroke Scale Total (if not done complete detailed one below):    1a.  Level of consciousness:  0 - alert; keenly responsive  1b.  Level of consciousness questions:  0 - answers both questions correctly  1c.  Level of consciousness questions:  0 - performs both tasks correctly  2.    Best Gaze:  0 - normal  3.    Visual:  0 - no visual loss  4.    Facial Palsy:  0 - normal symmetric movement  5a.  Motor left arm:  0 - no drift, limb holds 90 (or 45) degrees for full 10 seconds  5b.  Motor right arm:  0 - no drift, limb holds 90 (or 45) degrees for full 10 seconds  6a.  Motor left le - no drift; leg holds 30 degree position for full 5 seconds  6b.  Motor right le - no drift; leg holds 30 degree position for full 5 seconds  7.    Limb Ataxia:  0 - absent  8.    Sensory:  0 - normal; no sensory loss  9.    Best Language:  0 - no aphasia, normal  10.  Dysarthria:  0 - normal  11.  Extinction and Inattention:  0 - no abnormality    NIH 0    LABS AND IMAGING:     RECENT LABS:  CBC with Differential:    Lab Results   Component Value Date/Time    WBC 15.3 2024 06:05 PM    RBC 4.35 2024 06:05 PM    HGB 13.0 2024 06:05 PM    HCT 38.9 2024 06:05 PM     2024 06:05 PM    MCV 89.4 2024 06:05 PM    MCH 29.8 2024 06:05 PM    MCHC 33.3 2024 06:05 PM    RDW 13.5 2024 06:05 PM    LYMPHOPCT 12 2024 06:05 PM    MONOPCT 6 2024 06:05 PM    BASOPCT 0 2024 06:05 PM    MONOSABS 0.92 2024 06:05 PM    LYMPHSABS 1.84 2024 06:05 PM    EOSABS 0.00 2024 06:05 PM    BASOSABS 0.00 2024 06:05 PM     BMP:    Lab Results   Component Value Date/Time     2024 06:05 PM    K 3.7 2024 06:05 PM     2024 06:05 PM    CO2 25 2024 06:05 PM    BUN 15 2024 06:05 PM    LABALBU 4.4 10/18/2023 09:11 AM    CREATININE 0.6 2024 06:05 PM    CALCIUM 9.4 2024 06:05 PM    LABGLOM >60 2024 06:05 PM

## 2024-01-13 NOTE — ANESTHESIA PRE PROCEDURE
Department of Anesthesiology  Preprocedure Note       Name:  Miladys Baker   Age:  63 y.o.  :  1960                                          MRN:  [id number]         Date:  2024      Surgeon: * No surgeons listed *    Procedure:  IR ANGIOGRAM CAROTID CEREBRAL BILATERAL     Medications prior to admission:   Prior to Admission medications    Medication Sig Start Date End Date Taking? Authorizing Provider   ibuprofen (ADVIL;MOTRIN) 600 MG tablet Take 1 tablet by mouth every 6 hours as needed for Pain    Les Neri MD   predniSONE (DELTASONE) 20 MG tablet Uses as needed 23   Les Neri MD   fexofenadine (ALLEGRA) 180 MG tablet Take 1 tablet by mouth daily 10/30/23   Abel Cuenca MD   lisinopril (PRINIVIL;ZESTRIL) 40 MG tablet Take 1 tablet by mouth daily 10/30/23   Abel Cuenca MD   albuterol sulfate HFA (PROVENTIL;VENTOLIN;PROAIR) 108 (90 Base) MCG/ACT inhaler Inhale 2 puffs into the lungs every 6 hours as needed for Wheezing 10/30/23   Abel Cuenca MD   furosemide (LASIX) 20 MG tablet Take 1 tablet by mouth 2 times daily  Patient not taking: Reported on 2024 10/30/23   Abel Cuenca MD   carvedilol (COREG) 3.125 MG tablet Take 1 tablet by mouth 2 times daily 10/30/23   Abel Cuenca MD   fluticasone (FLONASE) 50 MCG/ACT nasal spray 1 spray by Each Nostril route daily Use for allergies  Patient not taking: Reported on 2023 10/4/23   Abel Cuenca MD   fluticasone (FLONASE SENSIMIST) 27.5 MCG/SPRAY nasal spray 1 spray by Nasal route daily  Patient not taking: Reported on 2023   Les Neri MD       Current medications:    Current Facility-Administered Medications   Medication Dose Route Frequency Provider Last Rate Last Admin    labetalol (NORMODYNE;TRANDATE) injection 5 mg  5 mg IntraVENous Q1H PRN Desiree Squires DO        sodium chloride flush 0.9 % injection 5-40 mL  5-40 mL IntraVENous 2 times per day Jayme

## 2024-01-13 NOTE — ED NOTES
Report given to LENORE Lacey from [facility].   Report method by phone   The following was reviewed with receiving RN:   Current vital signs:  /65   Pulse 86   Temp 98.2 °F (36.8 °C) (Oral)   Resp 23   Ht 1.6 m (5' 3\")   Wt 74.8 kg (165 lb)   SpO2 96%   BMI 29.23 kg/m²                MEWS Score: 1     Any medication or safety alerts were reviewed. Any pending diagnostics and notifications were also reviewed, as well as any safety concerns or issues, abnormal labs, abnormal imaging, and abnormal assessment findings. Questions were answered.

## 2024-01-13 NOTE — CONSULTS
Endovascular Neurosurgery Consult    Pt Name: Miladys Baker  MRN: [id number]  YOB: 1960  Date of evaluation: 1/13/2024  Primary Care Physician: Abel Cuenca MD  Patient evaluated at the request of  Dr. Ott   Reason for evaluation: R MCA bifurcation giant aneurysm     SUBJECTIVE:   History of Chief Complaint:    Miladys Baker is a 63 y.o. female who presents with with sudden onset thunderclap headache with neck stiffness.  The patient denies any other focal neurological deficits.  CT head unrevealing for acute bleeding.  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     Of note, the patient has a very extensive significant family history of ruptured cerebral aneurysm (grandfather, father, paternal aunt who happened to be one of our patients as well)    The patient denies any history of vasculitis, kidney diseases, smoking.       Modified Luciano scale 0  Banks and Ibarra 2    Allergies  has No Known Allergies.  Medications  Prior to Admission medications    Medication Sig Start Date End Date Taking? Authorizing Provider   ibuprofen (ADVIL;MOTRIN) 600 MG tablet Take 1 tablet by mouth every 6 hours as needed for Pain    ProviderLes MD   predniSONE (DELTASONE) 20 MG tablet Uses as needed 5/22/23   Provider, MD Les   fexofenadine (ALLEGRA) 180 MG tablet Take 1 tablet by mouth daily 10/30/23   Abel Cuenca MD   lisinopril (PRINIVIL;ZESTRIL) 40 MG tablet Take 1 tablet by mouth daily 10/30/23   Abel Cuenca MD   albuterol sulfate HFA (PROVENTIL;VENTOLIN;PROAIR) 108 (90 Base) MCG/ACT inhaler Inhale 2 puffs into the lungs every 6 hours as needed for Wheezing 10/30/23   Abel Cuenca MD   furosemide (LASIX) 20 MG tablet Take 1 tablet by mouth 2 times daily  Patient not taking: Reported on 1/13/2024 10/30/23   Abel Cuenca MD   carvedilol (COREG) 3.125 MG tablet Take 1 tablet by mouth 2 times daily

## 2024-01-13 NOTE — ED TRIAGE NOTES
Pt presents to ED room 25 via EMS from [facility] c/o HTN and headache. Pt presents on cardene drip at 10 mg/hr. Per EMS, goal is SBPO of . Pt was given keppra, coreg, and started on cardene drip at previous facility. Pt also presents with bilateral lower edema.  Stroke hx, approx 15 years ago  Pt placed on full cardiac monitor, IV access established.

## 2024-01-13 NOTE — CONSULTS
Department of Neurosurgery                                            Nurse Practitioner Consult Note      Reason for Consult:  Right MCA aneurysm  Requesting Physician:  Elena  Neurosurgeon:   [] Dr. Carreon  [x] Dr. Brandon  [] Dr. Arvizu  [] Dr. Palumbo      History Obtained From:  patient, electronic medical record    CHIEF COMPLAINT:         Chief Complaint   Patient presents with    Hypertension       HISTORY OF PRESENT ILLNESS:       The patient is a 63 y.o. female with significant past medical history of TIA in  in [place] patient reports for approximately 2 weeks after she had residual left facial weakness that is resolved.  Hypertension for which patient takes lisinopril she supposed to be taking Coreg but stopped taking it secondary to it causing her nausea.  On 2024 patient went to an urgent care for a rash she said when they took her blood pressure it was 187/78.  While she was at work today she started having a headache that was not relieved by Tylenol she felt short of breath and flush and when she checked her blood pressure at that time it was 200/97.  This prompted her to go to the emergency department at [facility].  Initial pressure at [facility] was 134/74.  CT/CTA of the head was performed that demonstrated a right MCA 1.8 x 0.8x 0.6 cm narrow necked lobulated saccular aneurysm.  Patient was transferred to [place] for neuroendovascular and neurosurgical evaluation.     On exam patient is neurologically intact no focal deficit.  Neurosurgery was contacted and DSA tomorrow was recommended, if DSA shows easy coilable aneurysm as it  appears on CTA it should be coiled.             PAST MEDICAL HISTORY :       Past Medical History:        Diagnosis Date    Allergies     Asthma     Endometriosis     Hypertension     Skin cancer     on nose    Stroke (HCC)            Past Surgical History:        Procedure Laterality Date    BREAST CYST EXCISION Right             Withdraws pain - 4 []       Abnormal flexion - 3 []       Abnormal extension - 2 []       None - 1 []            Total GCS: 15              Cranial Nerves:    Cranial Nerves:    II: Visual acuity:   II: Visual fields:    III: Pupils:  equal, round, reactive to light  III,IV,VI: Extra Ocular Movements:  V: Facial sensation:    VII: Facial strength:  VIII: Hearing:  intact  IX: Palate:  intact  XI: Shoulder shrug:    XII: Tongue movement:         Motor Exam:  L deltoid 5/5; R deltoid 5/5  L biceps 5/5; R biceps 5/5  L triceps 5/5; R triceps 5/5  L wrist extension 5/5; R wrist extension 5/5  L intrinsics 5/5; R intrinsics 5/5      L iliopsoas 5/5 , R iliopsoas 5/5  L quadriceps 5/5; R quadriceps 5/5  L Dorsiflexion 5/5; R dorsiflexion 5/5  L Plantarflexion 5/5; R plantarflexion 5/5  L EHL 5/5; R EHL 5/5    Drift:  absent  Tone:  normal  Sensory:    Right Upper Extremity:  normal  Left Upper Extremity:  normal  Right Lower Extremity:  normal  Left Lower Extremity:  normal      Gait:  deferred   SKIN: no rash on exposed skin       LABS AND IMAGING:     CBC with Differential:    Lab Results   Component Value Date/Time    WBC 15.3 01/12/2024 06:05 PM    RBC 4.35 01/12/2024 06:05 PM    HGB 13.0 01/12/2024 06:05 PM    HCT 38.9 01/12/2024 06:05 PM     01/12/2024 06:05 PM    MCV 89.4 01/12/2024 06:05 PM    MCH 29.8 01/12/2024 06:05 PM    MCHC 33.3 01/12/2024 06:05 PM    RDW 13.5 01/12/2024 06:05 PM    LYMPHOPCT 12 01/12/2024 06:05 PM    MONOPCT 6 01/12/2024 06:05 PM    BASOPCT 0 01/12/2024 06:05 PM    MONOSABS 0.92 01/12/2024 06:05 PM    LYMPHSABS 1.84 01/12/2024 06:05 PM    EOSABS 0.00 01/12/2024 06:05 PM    BASOSABS 0.00 01/12/2024 06:05 PM     BMP:    Lab Results   Component Value Date/Time     01/12/2024 06:05 PM    K 3.7 01/12/2024 06:05 PM     01/12/2024 06:05 PM    CO2 25 01/12/2024 06:05 PM    BUN 15 01/12/2024 06:05 PM    LABALBU 4.4 10/18/2023 09:11 AM    CREATININE 0.6 01/12/2024 06:05 PM

## 2024-01-13 NOTE — CARE COORDINATION
Case Management Assessment  Initial Evaluation    Date/Time of Evaluation: 1/13/2024 12:39 PM  Assessment Completed by: Grecia Treviño    If patient is discharged prior to next notation, then this note serves as note for discharge by case management.    Patient Name: Miladys Baker                   YOB: 1960  Diagnosis: Intracranial aneurysm [I67.1]  Cerebral aneurysm [I67.1]                   Date / Time: 1/12/2024 11:09 PM    Patient Admission Status: Inpatient   Readmission Risk (Low < 19, Mod (19-27), High > 27): Readmission Risk Score: 5.8    Current PCP: Abel Cuenca MD  PCP verified by CM? (P) Yes (Dr Gorge Cuenca)    Chart Reviewed: Yes      History Provided by: (P) Patient  Patient Orientation: (P) Alert and Oriented, Person, Place, Situation, Self    Patient Cognition: (P) Alert    Hospitalization in the last 30 days (Readmission):  No    If yes, Readmission Assessment in CM Navigator will be completed.    Advance Directives:      Code Status: Full Code   Patient's Primary Decision Maker is: (P) Legal Next of Kin    Primary Decision Maker: Miguel Baker  Bria - [phone]    Discharge Planning:    Patient lives with: (P) Alone Type of Home: (P) House  Primary Care Giver: (P) Self  Patient Support Systems include: (P) Family Members   Current Financial resources: (P) Medicaid  Current community resources:    Current services prior to admission: (P) Durable Medical Equipment            Current DME: (P) Shower Chair            Type of Home Care services:  (P) None    ADLS  Prior functional level: (P) Independent in ADLs/IADLs  Current functional level: (P) Assistance with the following:, Bathing, Dressing, Toileting, Cooking, Housework, Shopping, Mobility (on bedrest)    PT AM-PAC:   /24  OT AM-PAC:   /24    Family can provide assistance at DC: (P) Yes  Would you like Case Management to discuss the discharge plan with any other family members/significant others, and if so, who? (P)

## 2024-01-13 NOTE — PROGRESS NOTES
SLP ALL NOTES  Facility/Department: [address] NEURO ICU  Initial Speech/Language/Cognitive Assessment    NAME: Miladys Baker  : 1960   MRN: [id number]  ADMISSION DATE: 2024  ADMITTING DIAGNOSIS: has HTN (hypertension); Intracranial aneurysm; and Cerebral aneurysm on their problem list.      Date of Eval: 2024   Evaluating Therapist: CARMENCITA Mauro    RECENT RESULTS  CT OF HEAD/MRI: IMPRESSION:  1. No acute intracranial abnormality.  2. There is a narrow necked lobulated saccular aneurysm arising from the  distal branching point of the right MCA measuring 1.8 x 0.8 x 0.6 cm.  3. No evidence of arterial stenosis or occlusion in the major arterial  vessels of the head and neck.  4. Right lobe thyroid nodule measuring 2.3 cm.  Recommend non emergent  thyroid ultrasound.    Primary Complaint: The patient is a 63 y.o. female significant past medical history of TIA in  in [place] patient reports for approximately 2 weeks after she had residual left facial weakness that is resolved.  Hypertension for which patient takes lisinopril she supposed to be taking Coreg but stopped taking it secondary to it causing her nausea.  On 2024 patient went to an urgent care for a rash she said when they took her blood pressure it was 187/78.  While she was at work today she started having a headache that was not relieved by Tylenol she felt short of breath and flush and when she checked her blood pressure at that time it was 200/97.  This prompted her to go to the emergency department at [facility].  Initial pressure at [facility] was 134/74.  CT/CTA of the head was performed that demonstrated a right MCA 1.8 x 0.8x 0.6 cm narrow necked lobulated saccular aneurysm.  Patient was transferred to [place] for neuroendovascular and neurosurgical evaluation.     On exam patient is neurologically intact no focal deficit.  Neurosurgery was contacted and DSA tomorrow was recommended, if DSA shows easily

## 2024-01-13 NOTE — BRIEF OP NOTE
[facility]    NEUROENDOVASCULAR SERVICE: POST-OP NOTE: 1/13/2024    Pt Name: Miladys Baker  MRN: [id number]  YOB: 1960  Date of Procedure: 1/13/2024  Primary Care Physician: Abel Cuenca MD  Referring Physician:MD Tru      Pre-Procedural Diagnosis:R MCA bifurcation saccular aneurysm  Post-Procedural Diagnosis:same s/p WEB embolization       Procedure Performed:Cerebral angiogram with WEB embolization of cerebral aneurysm    Surgeon:   Kaley Garcia MD    Fellow:  Peter Lee MD     Assisting Tech:  Claudia Martinez    PRE-PROCEDURAL EXAM:    Neurological exam performed and unchanged from initial H&P or consult      Anesthesia: General Anesthesia  Complications: none    Intra-Operative EXAM:  Patient sedated with unchanged limited neurological exam    EBL: < less than 50       Cc            Specimens: Were not Obtained  Contrast:     Visipaque 270 low osmolar 95 Cc             Fluoro: 32.7 min    Findings:  Please see dictated Radiology note for further details  R MCA bifurcation saccular laterally pointing aneurysm with irregular margin and daughter sac measuring  10.04 mm in length x 8.59 mm in width x 4.41 mm neck  The above lesion was treated using 6F cook shuttle 90cm, mccoy diagnostic catheter, stiff glide wire, Viky 6F 115cm, VIA 33 133cm, Synchro Support and WEB device 10mm x 5mm  Tucking technique was used to adjust the WEB device         Before:        AFTER        Abel score: class I    WEB Occlusion Scale (WOS): A    POST-PROCEDURAL EXAM :   Stable neurological Exam  Neurological exam performed and unchanged from initial H&P or consult    Closure:  right Angioseal 6   F        POST-PROCEDURAL MONITORING : see orders  Disposition: Neuro ICU      Recommendations:  Back to Neuro ICU  Do not bend right leg for 3 hours.  Groin checks per protocol.  Peripheral pulse checks per protocol.  SBP goal   Loaded with rectal aspirin post procedure.  Please continue daily baby aspirin starting tomorrow morning   Rest of care per [facility]  Follow up with Peter Lee MD  2 weeks after discharge and Dr. Garcia 3 months after discharge.        MD Kaley Dejesus MD   Pager: [phone]  Stroke, Neurocritical Care & Neurointervention  [facility]  [facility]  [facility]  Electronically signed 1/13/2024 at 6:38 PM

## 2024-01-13 NOTE — PLAN OF CARE
Neurosurgery update    Patient seen and examined this morning  Reports mild headache-improved since admission  Neurosurgery consulted for Right MCA aneurysm    Alert oriented  Strength intact 5/5 BUE and BLE sensation intact  CN intact    Planning for DSA today with coiling of aneurysm     Electronically signed by SHAY Bruner NP on 1/13/2024 at 3:24 PM

## 2024-01-14 ENCOUNTER — SOCIAL WORK (OUTPATIENT)
Dept: EMERGENCY DEPT | Age: 64
End: 2024-01-14

## 2024-01-14 LAB
ANION GAP SERPL CALCULATED.3IONS-SCNC: 11 MMOL/L (ref 9–17)
BUN SERPL-MCNC: 15 MG/DL (ref 8–23)
CALCIUM SERPL-MCNC: 8 MG/DL (ref 8.6–10.4)
CHLORIDE SERPL-SCNC: 108 MMOL/L (ref 98–107)
CHOLEST SERPL-MCNC: 128 MG/DL
CHOLESTEROL/HDL RATIO: 2.6
CO2 SERPL-SCNC: 22 MMOL/L (ref 20–31)
CREAT SERPL-MCNC: 0.6 MG/DL (ref 0.5–0.9)
ERYTHROCYTE [DISTWIDTH] IN BLOOD BY AUTOMATED COUNT: 12.8 % (ref 11.8–14.4)
EST. AVERAGE GLUCOSE BLD GHB EST-MCNC: 105 MG/DL
GFR SERPL CREATININE-BSD FRML MDRD: >60 ML/MIN/1.73M2
GLUCOSE SERPL-MCNC: 159 MG/DL (ref 70–99)
HBA1C MFR BLD: 5.3 % (ref 4–6)
HCT VFR BLD AUTO: 32.2 % (ref 36.3–47.1)
HDLC SERPL-MCNC: 49 MG/DL
HGB BLD-MCNC: 10.9 G/DL (ref 11.9–15.1)
LDLC SERPL CALC-MCNC: 55 MG/DL (ref 0–130)
MAGNESIUM SERPL-MCNC: 2.1 MG/DL (ref 1.6–2.6)
MCH RBC QN AUTO: 30.5 PG (ref 25.2–33.5)
MCHC RBC AUTO-ENTMCNC: 33.9 G/DL (ref 28.4–34.8)
MCV RBC AUTO: 90.2 FL (ref 82.6–102.9)
NRBC BLD-RTO: 0 PER 100 WBC
PLATELET # BLD AUTO: 197 K/UL (ref 138–453)
PMV BLD AUTO: 10 FL (ref 8.1–13.5)
POTASSIUM SERPL-SCNC: 3.6 MMOL/L (ref 3.7–5.3)
RBC # BLD AUTO: 3.57 M/UL (ref 3.95–5.11)
SODIUM SERPL-SCNC: 141 MMOL/L (ref 135–144)
TRIGL SERPL-MCNC: 118 MG/DL
WBC OTHER # BLD: 7.1 K/UL (ref 3.5–11.3)

## 2024-01-14 PROCEDURE — 80048 BASIC METABOLIC PNL TOTAL CA: CPT

## 2024-01-14 PROCEDURE — 2580000003 HC RX 258: Performed by: STUDENT IN AN ORGANIZED HEALTH CARE EDUCATION/TRAINING PROGRAM

## 2024-01-14 PROCEDURE — 6360000002 HC RX W HCPCS: Performed by: EMERGENCY MEDICINE

## 2024-01-14 PROCEDURE — 2000000000 HC ICU R&B

## 2024-01-14 PROCEDURE — 6370000000 HC RX 637 (ALT 250 FOR IP): Performed by: STUDENT IN AN ORGANIZED HEALTH CARE EDUCATION/TRAINING PROGRAM

## 2024-01-14 PROCEDURE — APPSS15 APP SPLIT SHARED TIME 0-15 MINUTES: Performed by: NURSE PRACTITIONER

## 2024-01-14 PROCEDURE — 2580000003 HC RX 258: Performed by: REGISTERED NURSE

## 2024-01-14 PROCEDURE — 85027 COMPLETE CBC AUTOMATED: CPT

## 2024-01-14 PROCEDURE — 6370000000 HC RX 637 (ALT 250 FOR IP)

## 2024-01-14 PROCEDURE — 83735 ASSAY OF MAGNESIUM: CPT

## 2024-01-14 PROCEDURE — 6370000000 HC RX 637 (ALT 250 FOR IP): Performed by: REGISTERED NURSE

## 2024-01-14 PROCEDURE — 99232 SBSQ HOSP IP/OBS MODERATE 35: CPT | Performed by: NEUROLOGICAL SURGERY

## 2024-01-14 PROCEDURE — 80061 LIPID PANEL: CPT

## 2024-01-14 PROCEDURE — 6370000000 HC RX 637 (ALT 250 FOR IP): Performed by: EMERGENCY MEDICINE

## 2024-01-14 RX ORDER — ENOXAPARIN SODIUM 100 MG/ML
40 INJECTION SUBCUTANEOUS DAILY
Status: DISCONTINUED | OUTPATIENT
Start: 2024-01-14 | End: 2024-01-19 | Stop reason: HOSPADM

## 2024-01-14 RX ORDER — ASPIRIN 81 MG/1
81 TABLET, CHEWABLE ORAL DAILY
Status: DISCONTINUED | OUTPATIENT
Start: 2024-01-14 | End: 2024-01-19 | Stop reason: HOSPADM

## 2024-01-14 RX ORDER — DOCUSATE SODIUM 100 MG/1
100 CAPSULE, LIQUID FILLED ORAL DAILY
Status: DISCONTINUED | OUTPATIENT
Start: 2024-01-14 | End: 2024-01-19 | Stop reason: HOSPADM

## 2024-01-14 RX ADMIN — ASPIRIN 81 MG 81 MG: 81 TABLET ORAL at 08:18

## 2024-01-14 RX ADMIN — NIMODIPINE 60 MG: 30 CAPSULE, LIQUID FILLED ORAL at 04:15

## 2024-01-14 RX ADMIN — NIMODIPINE 60 MG: 30 CAPSULE, LIQUID FILLED ORAL at 20:11

## 2024-01-14 RX ADMIN — OXYCODONE HYDROCHLORIDE AND ACETAMINOPHEN 1 TABLET: 5; 325 TABLET ORAL at 00:15

## 2024-01-14 RX ADMIN — ENOXAPARIN SODIUM 40 MG: 100 INJECTION SUBCUTANEOUS at 16:05

## 2024-01-14 RX ADMIN — NIMODIPINE 60 MG: 30 CAPSULE, LIQUID FILLED ORAL at 08:18

## 2024-01-14 RX ADMIN — ACETAMINOPHEN 650 MG: 325 TABLET ORAL at 08:19

## 2024-01-14 RX ADMIN — NIMODIPINE 60 MG: 30 CAPSULE, LIQUID FILLED ORAL at 16:05

## 2024-01-14 RX ADMIN — NIMODIPINE 60 MG: 30 CAPSULE, LIQUID FILLED ORAL at 12:19

## 2024-01-14 RX ADMIN — DOCUSATE SODIUM 100 MG: 100 CAPSULE, LIQUID FILLED ORAL at 08:18

## 2024-01-14 RX ADMIN — SODIUM CHLORIDE, PRESERVATIVE FREE 10 ML: 5 INJECTION INTRAVENOUS at 20:16

## 2024-01-14 RX ADMIN — NIMODIPINE 60 MG: 30 CAPSULE, LIQUID FILLED ORAL at 23:51

## 2024-01-14 NOTE — PROGRESS NOTES
Endovascular Neurosurgery Progress Note    Pt Name: Miladys Baker  MRN: [id number]  YOB: 1960  Date of evaluation: 1/14/2024  Primary Care Physician: Abel Cuenca MD  Patient evaluated at the request of  Dr. Ott   Reason for evaluation: R MCA bifurcation giant aneurysm     SUBJECTIVE:   POD 1 s/p WEB emobolization. Doing well. C/o mild headache. Neurologically intact. Access site looks clean.       History of Chief Complaint:    Miladys Baker is a 63 y.o. female who presents with with sudden onset thunderclap headache with neck stiffness.  The patient denies any other focal neurological deficits.  CT head unrevealing for acute bleeding.  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     Of note, the patient has a very extensive significant family history of ruptured cerebral aneurysm (grandfather, father, paternal aunt who happened to be one of our patients as well)    The patient denies any history of vasculitis, kidney diseases, smoking.       Modified Luciano scale 0  Banks and Ibarra 2    Allergies  has No Known Allergies.  Medications  Prior to Admission medications    Medication Sig Start Date End Date Taking? Authorizing Provider   ibuprofen (ADVIL;MOTRIN) 600 MG tablet Take 1 tablet by mouth every 6 hours as needed for Pain    ProviderLes MD   predniSONE (DELTASONE) 20 MG tablet Uses as needed 5/22/23   ProviderLes MD   fexofenadine (ALLEGRA) 180 MG tablet Take 1 tablet by mouth daily 10/30/23   Abel Cuenca MD   lisinopril (PRINIVIL;ZESTRIL) 40 MG tablet Take 1 tablet by mouth daily 10/30/23   Abel Cuenca MD   albuterol sulfate HFA (PROVENTIL;VENTOLIN;PROAIR) 108 (90 Base) MCG/ACT inhaler Inhale 2 puffs into the lungs every 6 hours as needed for Wheezing 10/30/23   Abel Cuenca MD   furosemide (LASIX) 20 MG tablet Take 1 tablet by mouth 2 times daily  Patient not taking: Reported  in her father and mother; Hypertension in her paternal grandfather and paternal grandmother.    Review of Systems:  CONSTITUTIONAL:  negative for fevers, chills, fatigue and malaise    EYES:  negative for double vision, blurred vision and photophobia     HEENT:  negative for tinnitus, epistaxis and sore throat    RESPIRATORY:  negative for cough, shortness of breath, wheezing    CARDIOVASCULAR:  negative for chest pain, palpitations, syncope, edema    GASTROINTESTINAL:  negative for nausea, vomiting    GENITOURINARY:  negative for incontinence    MUSCULOSKELETAL:  negative for neck or back pain    NEUROLOGICAL:  Negative for weakness and tingling  negative for headaches and dizziness    PSYCHIATRIC:  negative for anxiety      Review of systems otherwise negative.      OBJECTIVE:   Vitals: BP (!) 90/43   Pulse 54   Temp 98.1 °F (36.7 °C)   Resp 16   Ht 1.6 m (5' 3\")   Wt 74.8 kg (165 lb)   SpO2 97%   BMI 29.23 kg/m²     Neuro Exam:  Mental Status: Awake, alert oriented to person, place, and time. Follow commands   Language: Fluent, Coherent, Repetition Intact, No dysarthria  Cranial Nerves: PERRL, EOMI, VF intact, facial sensation intact, facial expression symmetric, hearing intact to finger rub bilaterally, symmetric palate elevation, shoulder shrug intact and symmetric, tongue protrudes midline  Motor:  Normal muscle bulk  Normal muscle tone  R : 5/5 upper and 5/5 lower extremity strength  L  : 5/5 upper and 5/5 lower extremity strength    Reflexes: 2+ throughout  Sensory: Intact  Cerebellar: FNF intact   Gait and Station: deferred        LABS:     Recent Labs     01/12/24  1805 01/13/24  0911 01/13/24  1141 01/14/24  0521 01/14/24  0623   WBC 15.3* 7.7  --  7.1  --    HGB 13.0 12.7  --  10.9*  --    HCT 38.9 38.9  --  32.2*  --     209  --  197  --     141  --   --  141   K 3.7 3.4*  --   --  3.6*    108*  --   --  108*   CO2 25 24  --   --  22   BUN 15 12  --   --  15   CREATININE 0.6

## 2024-01-14 NOTE — PROGRESS NOTES
Daily Progress Note  Neuro Critical Care    Patient Name: Miladys Baker  Patient : 1960  Room/Bed: 0128/0128-01  Code Status: Full Code  Allergies: No Known Allergies    CHIEF COMPLAINT:     Headache, R MCA aneurysm      INTERVAL HISTORY    Initial Presentation (Admitted 2024):  The patient is a 63 y.o. female with significant past medical history of TIA in  in [place] patient reports for approximately 2 weeks after she had residual left facial weakness that is resolved.  Hypertension for which patient takes lisinopril she supposed to be taking Coreg but stopped taking it secondary to it causing her nausea.  On 2024 patient went to an urgent care for a rash she said when they took her blood pressure it was 187/78.  While she was at work today she started having a headache that was not relieved by Tylenol she felt short of breath and flush and when she checked her blood pressure at that time it was 200/97.  This prompted her to go to the emergency department at [facility].  Initial pressure at [facility] was 134/74.  CT/CTA of the head was performed that demonstrated a right MCA 1.8 x 0.8x 0.6 cm narrow necked lobulated saccular aneurysm.  Patient was transferred to [place] for neuroendovascular and neurosurgical evaluation.     On exam patient is neurologically intact no focal deficit.    Hospital Course:   : Admitted to [facility] for R MCA saccular aneurysm seen on CT and MRI measuring 1.8x 0.8x 0.6cm. Taken to cath with [facility] for Web device placement     Last 24h:   To OR yesterday with NEW for WEB device placement. Post operatively had headache treated with tylenol and percocet. Afebrile. No other issues.     CURRENT MEDICATIONS:  SCHEDULED MEDICATIONS:   docusate sodium  100 mg Oral Daily    aspirin  81 mg Oral Daily    enoxaparin  40 mg SubCUTAneous Daily    sodium chloride flush  5-40 mL IntraVENous 2 times per day    sodium chloride flush  5-40 mL IntraVENous 2 times per

## 2024-01-14 NOTE — PROGRESS NOTES
Neurosurgery [place]/Resident    Daily Progress Note   CC:  Chief Complaint   Patient presents with    Hypertension     1/14/2024  6:53 AM    Chart reviewed.  Went to angio yesterday and they did WEB device on aneurysm, reports mild headache     Vitals:    01/14/24 0300 01/14/24 0400 01/14/24 0500 01/14/24 0600   BP: (!) 95/41 (!) 85/39 (!) 103/38 (!) 105/42   Pulse: 58 54 58 61   Resp:       Temp:  98.1 °F (36.7 °C)     TempSrc:       SpO2: 97% 96% 96% 97%   Weight:       Height:           PE:   Awake alert oriented    PERRL, EOMI  Cranial Nerves:    II: Visual acuity:  normal  III: Pupils:  equal, round, reactive to light  III,IV,VI: Extra Ocular Movements:intact  V: Facial sensation:  intact  VII: Facial strength: intact  VIII: Hearing:  intact  IX: Palate:  intact  XI: Shoulder shrug: intact  XII: Tongue movement: intact      Motor   L deltoid 5/5; R deltoid 5/5  L biceps 5/5; R biceps 5/5  L triceps 5/5; R triceps 5/5  L wrist extension 5/5; R wrist extension 5/5  L intrinsics 5/5; R intrinsics 5/5      L iliopsoas 5/5 , R iliopsoas 5/5  L quadriceps 5/5; R quadriceps 5/5  L Dorsiflexion 5/5; R dorsiflexion 5/5  L Plantarflexion 5/5; R plantarflexion 5/5  L EHL 5/5; R EHL 5/5    Drift:  absent  Tone:  normal    Sensation: intact           Lab Results   Component Value Date    WBC 7.1 01/14/2024    HGB 10.9 (L) 01/14/2024    HCT 32.2 (L) 01/14/2024     01/14/2024    CHOL 163 01/13/2024    TRIG 130 01/13/2024    HDL 65 01/13/2024    ALT 12 10/18/2023    AST 20 10/18/2023     01/13/2024    K 3.4 (L) 01/13/2024     (H) 01/13/2024    CREATININE 0.6 01/13/2024    BUN 12 01/13/2024    CO2 24 01/13/2024    TSH 1.53 12/30/2023    GLUF 98 10/18/2023    LABA1C 5.2 10/18/2023           A/P  63 y.o. female who presents with headahce, right MCA bifurcation aneurysm   1/13 WEB device treatment for aneurysm     No neurosurgical interventions planned  Neurosurgery likely sign off today  Neuro checks per floor

## 2024-01-14 NOTE — PROGRESS NOTES
Physical Therapy          Physical Therapy Cancel Note      DATE: 2024    NAME: Miladys Baker  MRN: [id number]   : 1960      Patient not seen this date for Physical Therapy due to:    Pt has bed rest orders.      Electronically signed by Amberly Ellis PT on 2024 at 10:39 AM

## 2024-01-14 NOTE — PLAN OF CARE
NO ACUTE NEUROSURGICAL INTERVENTION     NEUROSURGERY TO SIGN OFF     Please contact Neurosurgery with any questions or acute changes in neurological exam    Electronically signed by SHAY Bruner NP on 1/14/2024 at 5:31 PM

## 2024-01-14 NOTE — PROGRESS NOTES
Assessment: Patient was awake and oriented when  visited. Family was not present at the time. Patient remained hopeful and seemed to have strong casper in God and in the power of prayer. When asked how she was feeling, patient responded; \"better.\" Patient said he was raised Jewish and a [facility] of [employer]. Patient received sacrament of anointing of the sick.   Intervention:  maintained listening presence, offered support and prayed with patient.  Outcome: Patient expressed appreciation for the anointing and blessing she received.   Plan: Follow up visits recommended for more prayers and support.

## 2024-01-15 LAB
ANION GAP SERPL CALCULATED.3IONS-SCNC: 8 MMOL/L (ref 9–17)
BUN SERPL-MCNC: 14 MG/DL (ref 8–23)
CALCIUM SERPL-MCNC: 8 MG/DL (ref 8.6–10.4)
CHLORIDE SERPL-SCNC: 109 MMOL/L (ref 98–107)
CHOLEST SERPL-MCNC: 125 MG/DL
CHOLESTEROL/HDL RATIO: 2.5
CO2 SERPL-SCNC: 23 MMOL/L (ref 20–31)
CREAT SERPL-MCNC: 0.6 MG/DL (ref 0.5–0.9)
EKG ATRIAL RATE: 72 BPM
EKG P AXIS: 59 DEGREES
EKG P-R INTERVAL: 182 MS
EKG Q-T INTERVAL: 396 MS
EKG QRS DURATION: 80 MS
EKG QTC CALCULATION (BAZETT): 433 MS
EKG R AXIS: 30 DEGREES
EKG T AXIS: 54 DEGREES
EKG VENTRICULAR RATE: 72 BPM
ERYTHROCYTE [DISTWIDTH] IN BLOOD BY AUTOMATED COUNT: 12.8 % (ref 11.8–14.4)
GFR SERPL CREATININE-BSD FRML MDRD: >60 ML/MIN/1.73M2
GLUCOSE SERPL-MCNC: 99 MG/DL (ref 70–99)
HCT VFR BLD AUTO: 32.2 % (ref 36.3–47.1)
HDLC SERPL-MCNC: 51 MG/DL
HGB BLD-MCNC: 10.7 G/DL (ref 11.9–15.1)
LDLC SERPL CALC-MCNC: 57 MG/DL (ref 0–130)
MAGNESIUM SERPL-MCNC: 2.2 MG/DL (ref 1.6–2.6)
MCH RBC QN AUTO: 30.4 PG (ref 25.2–33.5)
MCHC RBC AUTO-ENTMCNC: 33.2 G/DL (ref 28.4–34.8)
MCV RBC AUTO: 91.5 FL (ref 82.6–102.9)
NRBC BLD-RTO: 0 PER 100 WBC
PLATELET # BLD AUTO: 185 K/UL (ref 138–453)
PMV BLD AUTO: 9.6 FL (ref 8.1–13.5)
POTASSIUM SERPL-SCNC: 3.6 MMOL/L (ref 3.7–5.3)
RBC # BLD AUTO: 3.52 M/UL (ref 3.95–5.11)
SODIUM SERPL-SCNC: 140 MMOL/L (ref 135–144)
TRIGL SERPL-MCNC: 85 MG/DL
WBC OTHER # BLD: 6.1 K/UL (ref 3.5–11.3)

## 2024-01-15 PROCEDURE — 6360000002 HC RX W HCPCS: Performed by: NURSE PRACTITIONER

## 2024-01-15 PROCEDURE — 36415 COLL VENOUS BLD VENIPUNCTURE: CPT

## 2024-01-15 PROCEDURE — 80048 BASIC METABOLIC PNL TOTAL CA: CPT

## 2024-01-15 PROCEDURE — 6360000002 HC RX W HCPCS: Performed by: EMERGENCY MEDICINE

## 2024-01-15 PROCEDURE — 80061 LIPID PANEL: CPT

## 2024-01-15 PROCEDURE — 6370000000 HC RX 637 (ALT 250 FOR IP): Performed by: REGISTERED NURSE

## 2024-01-15 PROCEDURE — 2580000003 HC RX 258: Performed by: STUDENT IN AN ORGANIZED HEALTH CARE EDUCATION/TRAINING PROGRAM

## 2024-01-15 PROCEDURE — 99291 CRITICAL CARE FIRST HOUR: CPT | Performed by: STUDENT IN AN ORGANIZED HEALTH CARE EDUCATION/TRAINING PROGRAM

## 2024-01-15 PROCEDURE — 83735 ASSAY OF MAGNESIUM: CPT

## 2024-01-15 PROCEDURE — 6370000000 HC RX 637 (ALT 250 FOR IP): Performed by: EMERGENCY MEDICINE

## 2024-01-15 PROCEDURE — 2000000000 HC ICU R&B

## 2024-01-15 PROCEDURE — 2580000003 HC RX 258: Performed by: REGISTERED NURSE

## 2024-01-15 PROCEDURE — 85027 COMPLETE CBC AUTOMATED: CPT

## 2024-01-15 RX ORDER — LABETALOL HYDROCHLORIDE 5 MG/ML
5 INJECTION, SOLUTION INTRAVENOUS
Status: DISCONTINUED | OUTPATIENT
Start: 2024-01-15 | End: 2024-01-19 | Stop reason: HOSPADM

## 2024-01-15 RX ADMIN — NIMODIPINE 60 MG: 30 CAPSULE, LIQUID FILLED ORAL at 04:00

## 2024-01-15 RX ADMIN — DOCUSATE SODIUM 100 MG: 100 CAPSULE, LIQUID FILLED ORAL at 07:50

## 2024-01-15 RX ADMIN — NIMODIPINE 60 MG: 30 CAPSULE, LIQUID FILLED ORAL at 07:50

## 2024-01-15 RX ADMIN — ENOXAPARIN SODIUM 40 MG: 100 INJECTION SUBCUTANEOUS at 07:50

## 2024-01-15 RX ADMIN — SODIUM CHLORIDE, PRESERVATIVE FREE 10 ML: 5 INJECTION INTRAVENOUS at 07:50

## 2024-01-15 RX ADMIN — SODIUM CHLORIDE, PRESERVATIVE FREE 10 ML: 5 INJECTION INTRAVENOUS at 20:20

## 2024-01-15 RX ADMIN — ASPIRIN 81 MG 81 MG: 81 TABLET ORAL at 07:50

## 2024-01-15 RX ADMIN — SODIUM CHLORIDE, PRESERVATIVE FREE 10 ML: 5 INJECTION INTRAVENOUS at 07:58

## 2024-01-15 RX ADMIN — NIMODIPINE 60 MG: 30 CAPSULE, LIQUID FILLED ORAL at 12:43

## 2024-01-15 RX ADMIN — NIMODIPINE 60 MG: 30 CAPSULE, LIQUID FILLED ORAL at 20:12

## 2024-01-15 RX ADMIN — LABETALOL HYDROCHLORIDE 5 MG: 5 INJECTION, SOLUTION INTRAVENOUS at 12:44

## 2024-01-15 RX ADMIN — NIMODIPINE 60 MG: 30 CAPSULE, LIQUID FILLED ORAL at 16:47

## 2024-01-15 ASSESSMENT — PAIN SCALES - GENERAL
PAINLEVEL_OUTOF10: 0

## 2024-01-15 NOTE — PROGRESS NOTES
Daily Progress Note  Neuro Critical Care    Patient Name: Miladys Baker  Patient : 1960  Room/Bed: 0128/0128-01  Code Status: Full Code  Allergies: No Known Allergies    CHIEF COMPLAINT:     Headache, R MCA aneurysm      INTERVAL HISTORY    Initial Presentation (Admitted 2024):  The patient is a 63 y.o. female with significant past medical history of TIA in  in [place] patient reports for approximately 2 weeks after she had residual left facial weakness that is resolved.  Hypertension for which patient takes lisinopril she supposed to be taking Coreg but stopped taking it secondary to it causing her nausea.  On 2024 patient went to an urgent care for a rash she said when they took her blood pressure it was 187/78.  While she was at work today she started having a headache that was not relieved by Tylenol she felt short of breath and flush and when she checked her blood pressure at that time it was 200/97.  This prompted her to go to the emergency department at [facility].  Initial pressure at [facility] was 134/74.  CT/CTA of the head was performed that demonstrated a right MCA 1.8 x 0.8x 0.6 cm narrow necked lobulated saccular aneurysm.  Patient was transferred to [place] for neuroendovascular and neurosurgical evaluation.     On exam patient is neurologically intact no focal deficit.    Hospital Course:   : Admitted to [facility] for R MCA saccular aneurysm seen on CT and MRI measuring 1.8x 0.8x 0.6cm. Taken to cath with [facility] for Web device placement   : Neurosurgery signed off. [facility] recommend 3 days post op in [facility] followed by 4 days on floor for monitoring in setting of sentinel bleed. Maintain SBP <120     Last 24h:   No acute issues overnight. Afebrile. Started on DVT chemoppx yesterday. Patient reports no headaches or other complaints. Eating breakfast during exam this AM    CURRENT MEDICATIONS:  SCHEDULED MEDICATIONS:   docusate sodium  100 mg Oral Daily    aspirin  81

## 2024-01-15 NOTE — PROGRESS NOTES
Physical Therapy Cancel Note      DATE: 1/15/2024    NAME: Miladys Baker  MRN: [id number]   : 1960      Patient not seen this date for Physical Therapy due to:    Patient independent with functional mobility. Will defer PT evaluation at this time. Please reorder PT if future needs arise. Pt denies PT needs--states she's been getting up independently in the room, took a shower independently; RN confirmed.        Electronically signed by Sarath Fitzgerald PT on 1/15/2024 at 8:24 AM

## 2024-01-15 NOTE — PLAN OF CARE
Problem: Discharge Planning  Goal: Discharge to home or other facility with appropriate resources  1/14/2024 2020 by Mahi Guzmán RN  Outcome: Progressing  1/14/2024 1804 by Erlinda Florian RN  Outcome: Progressing     Problem: Safety - Adult  Goal: Free from fall injury  1/14/2024 2020 by Mahi Guzmán, RN  Outcome: Progressing  1/14/2024 1804 by Erlinda Florian, RN  Outcome: Progressing

## 2024-01-15 NOTE — PROGRESS NOTES
Endovascular Neurosurgery Progress Note    Pt Name: Miladys Baker  MRN: [id number]  YOB: 1960  Date of evaluation: 1/15/2024  Primary Care Physician: Abel Cuenca MD  Patient evaluated at the request of  Dr. Ott   Reason for evaluation: R MCA bifurcation giant aneurysm     SUBJECTIVE:     Doing well, no complaint    History of Chief Complaint 1/12/2024:    Miladys Baker is a 63 y.o. female who presents with with sudden onset thunderclap headache with neck stiffness.  The patient denies any other focal neurological deficits.  CT head unrevealing for acute bleeding.  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     Of note, the patient has a very extensive significant family history of ruptured cerebral aneurysm (grandfather, father, paternal aunt who happened to be one of our patients as well)    The patient denies any history of vasculitis, kidney diseases, smoking.       Modified Luciano scale 0  Hunt and Ibarra 2    Review of Systems:  CONSTITUTIONAL:  negative for fevers, chills, fatigue and malaise    EYES:  negative for double vision, blurred vision and photophobia     HEENT:  negative for tinnitus, epistaxis and sore throat    RESPIRATORY:  negative for cough, shortness of breath, wheezing    CARDIOVASCULAR:  negative for chest pain, palpitations, syncope, edema    GASTROINTESTINAL:  negative for nausea, vomiting    GENITOURINARY:  negative for incontinence    MUSCULOSKELETAL:  negative for neck or back pain    NEUROLOGICAL:  Negative for weakness and tingling  negative for headaches and dizziness    PSYCHIATRIC:  negative for anxiety      Review of systems otherwise negative.      OBJECTIVE:   Vitals: BP (!) 106/56   Pulse 63   Temp 98.4 °F (36.9 °C) (Oral)   Resp 16   Ht 1.6 m (5' 3\")   Wt 74.8 kg (165 lb)   SpO2 94%   BMI 29.23 kg/m²     Neuro Exam:  Mental Status: Awake, alert oriented to person,

## 2024-01-16 LAB
ANION GAP SERPL CALCULATED.3IONS-SCNC: 11 MMOL/L (ref 9–17)
BASOPHILS # BLD: 0.04 K/UL (ref 0–0.2)
BASOPHILS NFR BLD: 1 % (ref 0–2)
BUN SERPL-MCNC: 10 MG/DL (ref 8–23)
CALCIUM SERPL-MCNC: 8.7 MG/DL (ref 8.6–10.4)
CHLORIDE SERPL-SCNC: 103 MMOL/L (ref 98–107)
CO2 SERPL-SCNC: 24 MMOL/L (ref 20–31)
CREAT SERPL-MCNC: 0.4 MG/DL (ref 0.5–0.9)
EOSINOPHIL # BLD: 0.06 K/UL (ref 0–0.44)
EOSINOPHILS RELATIVE PERCENT: 1 % (ref 1–4)
ERYTHROCYTE [DISTWIDTH] IN BLOOD BY AUTOMATED COUNT: 12.6 % (ref 11.8–14.4)
GFR SERPL CREATININE-BSD FRML MDRD: >60 ML/MIN/1.73M2
GLUCOSE SERPL-MCNC: 133 MG/DL (ref 70–99)
HCT VFR BLD AUTO: 31.1 % (ref 36.3–47.1)
HGB BLD-MCNC: 10.5 G/DL (ref 11.9–15.1)
IMM GRANULOCYTES # BLD AUTO: <0.03 K/UL (ref 0–0.3)
IMM GRANULOCYTES NFR BLD: 0 %
LYMPHOCYTES NFR BLD: 1.54 K/UL (ref 1.1–3.7)
LYMPHOCYTES RELATIVE PERCENT: 28 % (ref 24–43)
MAGNESIUM SERPL-MCNC: 2.2 MG/DL (ref 1.6–2.6)
MCH RBC QN AUTO: 30.8 PG (ref 25.2–33.5)
MCHC RBC AUTO-ENTMCNC: 33.8 G/DL (ref 28.4–34.8)
MCV RBC AUTO: 91.2 FL (ref 82.6–102.9)
MONOCYTES NFR BLD: 0.5 K/UL (ref 0.1–1.2)
MONOCYTES NFR BLD: 9 % (ref 3–12)
NEUTROPHILS NFR BLD: 61 % (ref 36–65)
NEUTS SEG NFR BLD: 3.45 K/UL (ref 1.5–8.1)
NRBC BLD-RTO: 0 PER 100 WBC
PLATELET # BLD AUTO: 196 K/UL (ref 138–453)
PMV BLD AUTO: 9.5 FL (ref 8.1–13.5)
POTASSIUM SERPL-SCNC: 3.6 MMOL/L (ref 3.7–5.3)
RBC # BLD AUTO: 3.41 M/UL (ref 3.95–5.11)
SODIUM SERPL-SCNC: 138 MMOL/L (ref 135–144)
WBC OTHER # BLD: 5.6 K/UL (ref 3.5–11.3)

## 2024-01-16 PROCEDURE — 80048 BASIC METABOLIC PNL TOTAL CA: CPT

## 2024-01-16 PROCEDURE — 2580000003 HC RX 258: Performed by: STUDENT IN AN ORGANIZED HEALTH CARE EDUCATION/TRAINING PROGRAM

## 2024-01-16 PROCEDURE — 99291 CRITICAL CARE FIRST HOUR: CPT | Performed by: STUDENT IN AN ORGANIZED HEALTH CARE EDUCATION/TRAINING PROGRAM

## 2024-01-16 PROCEDURE — 6370000000 HC RX 637 (ALT 250 FOR IP): Performed by: EMERGENCY MEDICINE

## 2024-01-16 PROCEDURE — 85025 COMPLETE CBC W/AUTO DIFF WBC: CPT

## 2024-01-16 PROCEDURE — 2580000003 HC RX 258: Performed by: REGISTERED NURSE

## 2024-01-16 PROCEDURE — 94761 N-INVAS EAR/PLS OXIMETRY MLT: CPT

## 2024-01-16 PROCEDURE — 83735 ASSAY OF MAGNESIUM: CPT

## 2024-01-16 PROCEDURE — 6370000000 HC RX 637 (ALT 250 FOR IP): Performed by: REGISTERED NURSE

## 2024-01-16 PROCEDURE — 36415 COLL VENOUS BLD VENIPUNCTURE: CPT

## 2024-01-16 PROCEDURE — 2060000000 HC ICU INTERMEDIATE R&B

## 2024-01-16 RX ADMIN — SODIUM CHLORIDE, PRESERVATIVE FREE 10 ML: 5 INJECTION INTRAVENOUS at 20:21

## 2024-01-16 RX ADMIN — NIMODIPINE 60 MG: 30 CAPSULE, LIQUID FILLED ORAL at 04:06

## 2024-01-16 RX ADMIN — ASPIRIN 81 MG 81 MG: 81 TABLET ORAL at 07:59

## 2024-01-16 RX ADMIN — NIMODIPINE 60 MG: 30 CAPSULE, LIQUID FILLED ORAL at 07:59

## 2024-01-16 RX ADMIN — NIMODIPINE 60 MG: 30 CAPSULE, LIQUID FILLED ORAL at 16:28

## 2024-01-16 RX ADMIN — SODIUM CHLORIDE, PRESERVATIVE FREE 5 ML: 5 INJECTION INTRAVENOUS at 07:59

## 2024-01-16 RX ADMIN — NIMODIPINE 60 MG: 30 CAPSULE, LIQUID FILLED ORAL at 00:00

## 2024-01-16 RX ADMIN — NIMODIPINE 60 MG: 30 CAPSULE, LIQUID FILLED ORAL at 20:21

## 2024-01-16 RX ADMIN — NIMODIPINE 60 MG: 30 CAPSULE, LIQUID FILLED ORAL at 12:23

## 2024-01-16 NOTE — PROGRESS NOTES
Endovascular Neurosurgery Progress Note    Pt Name: Miladys Baker  MRN: [id number]  YOB: 1960  Date of evaluation: 1/16/2024  Primary Care Physician: Abel Cuenca MD  Patient evaluated at the request of  Dr. tOt   Reason for evaluation: R MCA bifurcation giant aneurysm     SUBJECTIVE:     Doing well, no complaint, sitting up watching TV    History of Chief Complaint 1/12/2024:    Miladys Baker is a 63 y.o. female who presents with with sudden onset thunderclap headache with neck stiffness.  The patient denies any other focal neurological deficits.  CT head unrevealing for acute bleeding.  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     Of note, the patient has a very extensive significant family history of ruptured cerebral aneurysm (grandfather, father, paternal aunt who happened to be one of our patients as well)    The patient denies any history of vasculitis, kidney diseases, smoking.       Modified Luciano scale 0  Hunt and Ibarra 2    Review of Systems:  CONSTITUTIONAL:  negative for fevers, chills, fatigue and malaise    EYES:  negative for double vision, blurred vision and photophobia     HEENT:  negative for tinnitus, epistaxis and sore throat    RESPIRATORY:  negative for cough, shortness of breath, wheezing    CARDIOVASCULAR:  negative for chest pain, palpitations, syncope, edema    GASTROINTESTINAL:  negative for nausea, vomiting    GENITOURINARY:  negative for incontinence    MUSCULOSKELETAL:  negative for neck or back pain    NEUROLOGICAL:  Negative for weakness and tingling  negative for headaches and dizziness    PSYCHIATRIC:  negative for anxiety      Review of systems otherwise negative.      OBJECTIVE:   Vitals: BP (!) 127/55   Pulse 71   Temp 97.9 °F (36.6 °C) (Oral)   Resp 22   Ht 1.6 m (5' 3\")   Wt 74.8 kg (165 lb)   SpO2 93%   BMI 29.23 kg/m²     Neuro Exam:  Mental Status: Awake, alert  shuttle 90cm, mccoy diagnostic catheter, stiff glide wire, Viky 6F 115cm, VIA 33 133cm, Synchro Support and WEB device 10mm x 5mm  Tucking technique was used to adjust the WEB device           Before:          AFTER          Abel score: class I     WEB Occlusion Scale (WOS): A    IMPRESSIONS:     Miladys is a 63-year-old lady with past medical history significant for TIA and hypertension who presented with sudden onset thunderclap headache and neck stiffness.  She was found to have a giant right MCA aneurysm measuring 1.8 x 1.8 x 0.6 cm.  The patient has a significant family history of ruptured cerebral aneurysms.     Dayne Ibarra is 2  Modified Luciano scale 0    Impression: sentinel SAH with hostile appearing giant right MCA aneurysm.     The patient underwent WEB device embolization on 1/13/2024 with uneventful course, achieving WOS A and RR 1.       PLANS:   - Continue with aspirin 81mg daily  - SBP goal <140  - NCC management  - con't to monitor  - TCD    - Follow up with Peter Lee MD  2 weeks after discharge and Dr. Garcia 3 months after discharge.    Discussed with Dr Radha Fritz MD PGY 8  Endovascular Neurosurgery Fellow   Stroke, Neurocritical Care & Neurointervention  [facility]  [facility]  11:13 AM

## 2024-01-16 NOTE — PROGRESS NOTES
Daily Progress Note  Neuro Critical Care    Patient Name: Miladys Baker  Patient : 1960  Room/Bed: 0128/0128-01  Code Status: Full Code  Allergies: No Known Allergies    CHIEF COMPLAINT:     Headache, R MCA aneurysm      INTERVAL HISTORY    Initial Presentation (Admitted 2024):  The patient is a 63 y.o. female with significant past medical history of TIA in  in [place] patient reports for approximately 2 weeks after she had residual left facial weakness that is resolved.  Hypertension for which patient takes lisinopril she supposed to be taking Coreg but stopped taking it secondary to it causing her nausea.  On 2024 patient went to an urgent care for a rash she said when they took her blood pressure it was 187/78.  While she was at work today she started having a headache that was not relieved by Tylenol she felt short of breath and flush and when she checked her blood pressure at that time it was 200/97.  This prompted her to go to the emergency department at [facility].  Initial pressure at [facility] was 134/74.  CT/CTA of the head was performed that demonstrated a right MCA 1.8 x 0.8x 0.6 cm narrow necked lobulated saccular aneurysm.  Patient was transferred to [place] for neuroendovascular and neurosurgical evaluation.     On exam patient is neurologically intact no focal deficit.    Hospital Course:   : Admitted to [facility] for R MCA saccular aneurysm seen on CT and MRI measuring 1.8x 0.8x 0.6cm. Taken to cath with [facility] for Web device placement   : Neurosurgery signed off. [facility] recommend 3 days post op in [facility] followed by 4 days on floor for monitoring in setting of sentinel bleed.     Last 24h:   No acute issues overnight. -130s.     CURRENT MEDICATIONS:  SCHEDULED MEDICATIONS:   docusate sodium  100 mg Oral Daily    aspirin  81 mg Oral Daily    enoxaparin  40 mg SubCUTAneous Daily    sodium chloride flush  5-40 mL IntraVENous 2 times per day    sodium chloride  60mg q4h   - PRN labetalol, has not required   - Cardene gtt PRN, titrate for goals   - patient loaded with ASA post operatively. ASA 81mg daily   - lipid panel: total cholesterol 125, HDL 51, LDL 57     Pulse Range: Pulse  Av.5  Min: 57  Max: 80  - Goal SBP <140    PULMONARY:  Respiration Range: Resp  Av.9  Min: 14  Max: 28  Current Pulse Ox: SpO2: 93 %  24HR Pulse Ox Range: SpO2  Av %  Min: 90 %  Max: 98 %  - Maintain SPO2 >92%    RENAL/FLUID/ELECTROLYTE:  - BUN 14/ Creatinine 0.6  - I/O: No intake/output data recorded.  - IVF: discontinue     GI/NUTRITION:  NUTRITION:  ADULT DIET; Regular  - Bowel regimen: colace   - GI prophylaxis: not indicated     ID:  Tmax: Temp (24hrs), Av.2 °F (36.8 °C), Min:97.9 °F (36.6 °C), Max:98.5 °F (36.9 °C)    Temperature Range: Temp: 97.9 °F (36.6 °C) Temp  Av.2 °F (36.8 °C)  Min: 97.9 °F (36.6 °C)  Max: 98.5 °F (36.9 °C)  - WBC   Lab Results   Component Value Date    WBC 5.6 2024     - Antimicrobials: not indicated     HEME:   Recent Labs     24  0521 01/15/24  0317 24  0423   HGB 10.9* 10.7* 10.5*   Stable   - Platelets 196    ENDOCRINE:  - Continue to monitor blood glucose, goal <180  - glucose controlled - most recent BGL is   Recent Labs     24  0623 01/15/24  0317 24  0423   GLUCOSE 159* 99 133*   - patient had been on prednisone taper prior to arrival for facial rash. She was also given dose of decadron at urgent care. Patient symptoms have resolved. Given low dose of prednisone will discontinue at this time.     OTHER:  - PT/OT/ST   - Code Status: Full Code      PROPHYLAXIS:  Stress ulcer: not indicated     DVT PROPHYLAXIS:  - SCD sleeves - Thigh High   - lovenox 40mg for DVT chemo ppx     DISPOSITION:  [] To remain ICU:   [x] OK for out of ICU from Neuro Critical Care standpoint    We will continue to follow along.     For any changes in exam or patient status please contact Neuro Critical Care.      Desiree Squires,

## 2024-01-17 LAB
ANION GAP SERPL CALCULATED.3IONS-SCNC: 9 MMOL/L (ref 9–17)
BASOPHILS # BLD: 0.05 K/UL (ref 0–0.2)
BASOPHILS NFR BLD: 1 % (ref 0–2)
BUN SERPL-MCNC: 13 MG/DL (ref 8–23)
CALCIUM SERPL-MCNC: 8.9 MG/DL (ref 8.6–10.4)
CHLORIDE SERPL-SCNC: 105 MMOL/L (ref 98–107)
CO2 SERPL-SCNC: 25 MMOL/L (ref 20–31)
CREAT SERPL-MCNC: 0.5 MG/DL (ref 0.5–0.9)
EOSINOPHIL # BLD: 0.21 K/UL (ref 0–0.44)
EOSINOPHILS RELATIVE PERCENT: 4 % (ref 1–4)
ERYTHROCYTE [DISTWIDTH] IN BLOOD BY AUTOMATED COUNT: 12.6 % (ref 11.8–14.4)
GFR SERPL CREATININE-BSD FRML MDRD: >60 ML/MIN/1.73M2
GLUCOSE SERPL-MCNC: 105 MG/DL (ref 70–99)
HCT VFR BLD AUTO: 32.6 % (ref 36.3–47.1)
HGB BLD-MCNC: 10.7 G/DL (ref 11.9–15.1)
IMM GRANULOCYTES # BLD AUTO: <0.03 K/UL (ref 0–0.3)
IMM GRANULOCYTES NFR BLD: 0 %
LYMPHOCYTES NFR BLD: 1.72 K/UL (ref 1.1–3.7)
LYMPHOCYTES RELATIVE PERCENT: 32 % (ref 24–43)
MAGNESIUM SERPL-MCNC: 2.4 MG/DL (ref 1.6–2.6)
MCH RBC QN AUTO: 30.2 PG (ref 25.2–33.5)
MCHC RBC AUTO-ENTMCNC: 32.8 G/DL (ref 28.4–34.8)
MCV RBC AUTO: 92.1 FL (ref 82.6–102.9)
MONOCYTES NFR BLD: 0.52 K/UL (ref 0.1–1.2)
MONOCYTES NFR BLD: 10 % (ref 3–12)
NEUTROPHILS NFR BLD: 53 % (ref 36–65)
NEUTS SEG NFR BLD: 2.91 K/UL (ref 1.5–8.1)
NRBC BLD-RTO: 0 PER 100 WBC
PLATELET # BLD AUTO: 206 K/UL (ref 138–453)
PMV BLD AUTO: 9.5 FL (ref 8.1–13.5)
POTASSIUM SERPL-SCNC: 4 MMOL/L (ref 3.7–5.3)
RBC # BLD AUTO: 3.54 M/UL (ref 3.95–5.11)
SODIUM SERPL-SCNC: 139 MMOL/L (ref 135–144)
WBC OTHER # BLD: 5.4 K/UL (ref 3.5–11.3)

## 2024-01-17 PROCEDURE — 80048 BASIC METABOLIC PNL TOTAL CA: CPT

## 2024-01-17 PROCEDURE — 6360000002 HC RX W HCPCS: Performed by: EMERGENCY MEDICINE

## 2024-01-17 PROCEDURE — 6370000000 HC RX 637 (ALT 250 FOR IP): Performed by: REGISTERED NURSE

## 2024-01-17 PROCEDURE — 2060000000 HC ICU INTERMEDIATE R&B

## 2024-01-17 PROCEDURE — 6370000000 HC RX 637 (ALT 250 FOR IP): Performed by: STUDENT IN AN ORGANIZED HEALTH CARE EDUCATION/TRAINING PROGRAM

## 2024-01-17 PROCEDURE — 6370000000 HC RX 637 (ALT 250 FOR IP): Performed by: EMERGENCY MEDICINE

## 2024-01-17 PROCEDURE — 85025 COMPLETE CBC W/AUTO DIFF WBC: CPT

## 2024-01-17 PROCEDURE — 2580000003 HC RX 258: Performed by: STUDENT IN AN ORGANIZED HEALTH CARE EDUCATION/TRAINING PROGRAM

## 2024-01-17 PROCEDURE — 99232 SBSQ HOSP IP/OBS MODERATE 35: CPT | Performed by: STUDENT IN AN ORGANIZED HEALTH CARE EDUCATION/TRAINING PROGRAM

## 2024-01-17 PROCEDURE — 36415 COLL VENOUS BLD VENIPUNCTURE: CPT

## 2024-01-17 PROCEDURE — 83735 ASSAY OF MAGNESIUM: CPT

## 2024-01-17 RX ADMIN — NIMODIPINE 60 MG: 30 CAPSULE, LIQUID FILLED ORAL at 20:32

## 2024-01-17 RX ADMIN — DOCUSATE SODIUM 100 MG: 100 CAPSULE, LIQUID FILLED ORAL at 08:57

## 2024-01-17 RX ADMIN — ASPIRIN 81 MG 81 MG: 81 TABLET ORAL at 08:57

## 2024-01-17 RX ADMIN — NIMODIPINE 60 MG: 30 CAPSULE, LIQUID FILLED ORAL at 16:30

## 2024-01-17 RX ADMIN — ENOXAPARIN SODIUM 40 MG: 100 INJECTION SUBCUTANEOUS at 08:57

## 2024-01-17 RX ADMIN — NIMODIPINE 60 MG: 30 CAPSULE, LIQUID FILLED ORAL at 08:57

## 2024-01-17 RX ADMIN — SODIUM CHLORIDE, PRESERVATIVE FREE 10 ML: 5 INJECTION INTRAVENOUS at 20:33

## 2024-01-17 RX ADMIN — NIMODIPINE 60 MG: 30 CAPSULE, LIQUID FILLED ORAL at 13:10

## 2024-01-17 RX ADMIN — ACETAMINOPHEN 650 MG: 325 TABLET ORAL at 20:32

## 2024-01-17 RX ADMIN — NIMODIPINE 60 MG: 30 CAPSULE, LIQUID FILLED ORAL at 04:13

## 2024-01-17 RX ADMIN — SODIUM CHLORIDE, PRESERVATIVE FREE 10 ML: 5 INJECTION INTRAVENOUS at 08:57

## 2024-01-17 NOTE — PROGRESS NOTES
Daily Progress Note  Neuro Critical Care    Patient Name: Miladys Baker  Patient : 1960  Room/Bed: 0141/0141-01  Code Status: Full Code  Allergies: No Known Allergies    CHIEF COMPLAINT:     Headache, R MCA aneurysm      INTERVAL HISTORY    Initial Presentation (Admitted 2024):  The patient is a 63 y.o. female with significant past medical history of TIA in  in [place] patient reports for approximately 2 weeks after she had residual left facial weakness that is resolved.  Hypertension for which patient takes lisinopril she supposed to be taking Coreg but stopped taking it secondary to it causing her nausea.  On 2024 patient went to an urgent care for a rash she said when they took her blood pressure it was 187/78.  While she was at work today she started having a headache that was not relieved by Tylenol she felt short of breath and flush and when she checked her blood pressure at that time it was 200/97.  This prompted her to go to the emergency department at [facility].  Initial pressure at [facility] was 134/74.  CT/CTA of the head was performed that demonstrated a right MCA 1.8 x 0.8x 0.6 cm narrow necked lobulated saccular aneurysm.  Patient was transferred to [facility] for neuroendovascular and neurosurgical evaluation.     On exam patient is neurologically intact no focal deficit.    Hospital Course:   : Admitted to [facility] for R MCA saccular aneurysm seen on CT and MRI measuring 1.8x 0.8x 0.6cm. Taken to cath with ARLYN for Web device placement   : Neurosurgery signed off. ARLYN recommend 3 days post op in [facility] followed by 4 days on floor for monitoring in setting of sentinel bleed.   : transferred to [facility]    Last 24h:   No acute issues overnight. -140s. Nighttime dose of Nimodipine was held for SBP in low 100s. Patient states she slept well, no complaints this AM. Afebrile.     CURRENT MEDICATIONS:  SCHEDULED MEDICATIONS:   docusate sodium  100 mg Oral Daily      CONSTITUTIONAL:  Well developed, well nourished, alert and oriented x 3, in no acute distress. GCS 15. Nontoxic. No dysarthria. No aphasia.   HEAD:  normocephalic, atraumatic    EYES:  PERRLA, EOMI. Visual Acuity and Peripheral vision in tact b/l   ENT:  moist mucous membranes   NECK:  supple, symmetric   LUNGS:  Equal air entry bilaterally   CARDIOVASCULAR:  normal s1 / s2, RRR, distal pulses intact   ABDOMEN:  Soft, no rigidity   NEUROLOGIC:  Mental Status:  A & O x3,awake             Cranial Nerves:    II: Visual acuity:  normal  II: Visual fields:  normal  III: Pupils:  equal, round, reactive to light  III,IV,VI: Extra Ocular Movements: intact  V: Facial sensation:  intact  VII: Facial strength: intact  VIII: Hearing:  intact  IX: Palate:  intact  XI: Shoulder shrug:  intact  XII: Tongue movement:  normal    Motor Exam:    Drift:  absent  Tone:  normal    MOTOR:  RUE: 5/5  LUE: 5/5  RLE: 5/5  LLE: 5/5    Sensory:    Touch:    Right Upper Extremity:  normal  Left Upper Extremity:  normal  Right Lower Extremity:  normal  Left Lower Extremity:  normal    Deep Tendon Reflexes:    Right Bicep:  2+  Left Bicep:  2+  Right Knee:  2+  Left Knee:  2+    Plantar Response:  Right:  downgoing  Left:  downgoing    Clonus:  absent    Coordination/Dysmetria:  Heel to Shin:  Right:  normal  Finger to Nose:   Right:  normal        DRAINS:  [x] There are no drains for Neuro Critical Care to monitor at this time.     ASSESSMENT AND PLAN:       NEUROLOGIC:  Right MCA narrow necked lobulated saccular aneurysm measuring 1.8x0.8x0.6cm  - MRI Brain on  with large R MCA   - Goal SBP  <140  - Neuro checks per protocol  -  OR with NEV for WEB device. Recommends 3 days post operatively in NCC and then 4 days on floor for monitoring (POD #4)     CARDIOVASCULAR:  BP Range: Systolic (24hrs), Av , Min:108 , Max:140     Diastolic (24hrs), Av, Min:44, Max:72  - Nimodipine 60mg q4h   - PRN labetalol, has not required   -

## 2024-01-17 NOTE — PLAN OF CARE
Problem: Discharge Planning  Goal: Discharge to home or other facility with appropriate resources  1/17/2024 0330 by Nya Stuart RN  Outcome: Progressing  1/16/2024 1904 by Misti Webber RN  Outcome: Progressing     Problem: Safety - Adult  Goal: Free from fall injury  1/17/2024 0330 by Nya Stuart, RN  Outcome: Progressing  1/16/2024 1904 by Misti Webber RN  Outcome: Progressing

## 2024-01-17 NOTE — PLAN OF CARE
Problem: Discharge Planning  Goal: Discharge to home or other facility with appropriate resources  1/17/2024 1651 by Meghan Partida RN  Outcome: Progressing  1/17/2024 0330 by Nya Stuart RN  Outcome: Progressing     Problem: Safety - Adult  Goal: Free from fall injury  1/17/2024 1651 by Meghan Partida RN  Outcome: Progressing  1/17/2024 0330 by Nya Stuart RN  Outcome: Progressing

## 2024-01-17 NOTE — CARE COORDINATION
Transitional Planning  Plan to return home with assistance from family, has transportation, denies further needs.

## 2024-01-17 NOTE — PROGRESS NOTES
Endovascular Neurosurgery Progress Note    Pt Name: Miladys Baker  MRN: [id number]  YOB: 1960  Date of evaluation: 1/17/2024  Primary Care Physician: Abel Cuenca MD  Patient evaluated at the request of  Dr. Ott   Reason for evaluation: R MCA bifurcation giant aneurysm     SUBJECTIVE:     Doing well, no complaint, sitting up watching TV, feeling good today    History of Chief Complaint 1/12/2024:    Miladys Baker is a 63 y.o. female who presents with with sudden onset thunderclap headache with neck stiffness.  The patient denies any other focal neurological deficits.  CT head unrevealing for acute bleeding.  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     Of note, the patient has a very extensive significant family history of ruptured cerebral aneurysm (grandfather, father, paternal aunt who happened to be one of our patients as well)    The patient denies any history of vasculitis, kidney diseases, smoking.       Modified Luciano scale 0  Hunt and Ibarra 2    Review of Systems:  CONSTITUTIONAL:  negative for fevers, chills, fatigue and malaise    EYES:  negative for double vision, blurred vision and photophobia     HEENT:  negative for tinnitus, epistaxis and sore throat    RESPIRATORY:  negative for cough, shortness of breath, wheezing    CARDIOVASCULAR:  negative for chest pain, palpitations, syncope, edema    GASTROINTESTINAL:  negative for nausea, vomiting    GENITOURINARY:  negative for incontinence    MUSCULOSKELETAL:  negative for neck or back pain    NEUROLOGICAL:  Negative for weakness and tingling  negative for headaches and dizziness    PSYCHIATRIC:  negative for anxiety      Review of systems otherwise negative.      OBJECTIVE:   Vitals: BP (!) 145/59   Pulse 66   Temp 98.3 °F (36.8 °C) (Oral)   Resp 11   Ht 1.6 m (5' 3\")   Wt 74.8 kg (165 lb)   SpO2 97%   BMI 29.23 kg/m²     Neuro Exam:  Mental  Status: Awake, alert oriented to person, place, and time. Follow commands   Language: Fluent, Coherent, Repetition Intact, No dysarthria  Cranial Nerves: PERRL, EOMI, VF intact, facial sensation intact, facial expression symmetric, hearing intact to finger rub bilaterally, symmetric palate elevation, shoulder shrug intact and symmetric, tongue protrudes midline  Motor:  Normal muscle bulk  Normal muscle tone  R : 5/5 upper and 5/5 lower extremity strength  L  : 5/5 upper and 5/5 lower extremity strength    Reflexes: 2+ throughout  Sensory: Intact  Cerebellar: FNF intact   Gait and Station: deferred        LABS:     Recent Labs     01/15/24  0317 01/16/24  0423 01/17/24  0353   WBC 6.1 5.6 5.4   HGB 10.7* 10.5* 10.7*   HCT 32.2* 31.1* 32.6*    196 206    138 139   K 3.6* 3.6* 4.0   * 103 105   CO2 23 24 25   BUN 14 10 13   CREATININE 0.6 0.4* 0.5   MG 2.2 2.2 2.4   CALCIUM 8.0* 8.7 8.9     No results for input(s): \"ALKPHOS\", \"ALT\", \"AST\", \"BILITOT\", \"BILIDIR\", \"LABALBU\", \"AMYLASE\", \"LIPASE\" in the last 72 hours.    RADIOLOGY:       2. There is a narrow necked lobulated saccular aneurysm arising from the  distal branching point of the right MCA measuring 1.8 x 0.8 x 0.6 cm.    Brain MRI 01/17/24  IMPRESSION:  1. No acute intracranial abnormality.  No acute infarct.  2. Mild chronic microvascular ischemic changes.  3. Large right MCA aneurysm better visualized on the recent CTA.      DSA 1/13/2024  Findings:  Please see dictated Radiology note for further details  R MCA bifurcation saccular laterally pointing aneurysm with irregular margin and daughter sac measuring  10.04 mm in length x 8.59 mm in width x 4.41 mm neck  The above lesion was treated using 6F cook shuttle 90cm, mccoy diagnostic catheter, stiff glide wire, Viky 6F 115cm, VIA 33 133cm, Synchro Support and WEB device 10mm x 5mm  Tucking technique was used to adjust the WEB device           Before:          AFTER          Abel

## 2024-01-18 ENCOUNTER — APPOINTMENT (OUTPATIENT)
Dept: VASCULAR LAB | Age: 64
End: 2024-01-18
Attending: PSYCHIATRY & NEUROLOGY
Payer: MEDICARE

## 2024-01-18 LAB
ANION GAP SERPL CALCULATED.3IONS-SCNC: 11 MMOL/L (ref 9–17)
BASOPHILS # BLD: 0.04 K/UL (ref 0–0.2)
BASOPHILS NFR BLD: 1 % (ref 0–2)
BUN SERPL-MCNC: 15 MG/DL (ref 8–23)
CALCIUM SERPL-MCNC: 8.9 MG/DL (ref 8.6–10.4)
CHLORIDE SERPL-SCNC: 103 MMOL/L (ref 98–107)
CO2 SERPL-SCNC: 25 MMOL/L (ref 20–31)
CREAT SERPL-MCNC: 0.6 MG/DL (ref 0.5–0.9)
EOSINOPHIL # BLD: 0.21 K/UL (ref 0–0.44)
EOSINOPHILS RELATIVE PERCENT: 4 % (ref 1–4)
ERYTHROCYTE [DISTWIDTH] IN BLOOD BY AUTOMATED COUNT: 12.7 % (ref 11.8–14.4)
GFR SERPL CREATININE-BSD FRML MDRD: >60 ML/MIN/1.73M2
GLUCOSE SERPL-MCNC: 105 MG/DL (ref 70–99)
HCT VFR BLD AUTO: 33.1 % (ref 36.3–47.1)
HGB BLD-MCNC: 10.8 G/DL (ref 11.9–15.1)
IMM GRANULOCYTES # BLD AUTO: 0.03 K/UL (ref 0–0.3)
IMM GRANULOCYTES NFR BLD: 1 %
LYMPHOCYTES NFR BLD: 1.67 K/UL (ref 1.1–3.7)
LYMPHOCYTES RELATIVE PERCENT: 33 % (ref 24–43)
MAGNESIUM SERPL-MCNC: 2.4 MG/DL (ref 1.6–2.6)
MCH RBC QN AUTO: 30.3 PG (ref 25.2–33.5)
MCHC RBC AUTO-ENTMCNC: 32.6 G/DL (ref 28.4–34.8)
MCV RBC AUTO: 92.7 FL (ref 82.6–102.9)
MONOCYTES NFR BLD: 0.48 K/UL (ref 0.1–1.2)
MONOCYTES NFR BLD: 9 % (ref 3–12)
NEUTROPHILS NFR BLD: 52 % (ref 36–65)
NEUTS SEG NFR BLD: 2.71 K/UL (ref 1.5–8.1)
NRBC BLD-RTO: 0 PER 100 WBC
PLATELET # BLD AUTO: 230 K/UL (ref 138–453)
PMV BLD AUTO: 9.5 FL (ref 8.1–13.5)
POTASSIUM SERPL-SCNC: 3.9 MMOL/L (ref 3.7–5.3)
RBC # BLD AUTO: 3.57 M/UL (ref 3.95–5.11)
SODIUM SERPL-SCNC: 139 MMOL/L (ref 135–144)
WBC OTHER # BLD: 5.1 K/UL (ref 3.5–11.3)

## 2024-01-18 PROCEDURE — 6370000000 HC RX 637 (ALT 250 FOR IP): Performed by: EMERGENCY MEDICINE

## 2024-01-18 PROCEDURE — 99232 SBSQ HOSP IP/OBS MODERATE 35: CPT | Performed by: STUDENT IN AN ORGANIZED HEALTH CARE EDUCATION/TRAINING PROGRAM

## 2024-01-18 PROCEDURE — 93886 INTRACRANIAL COMPLETE STUDY: CPT

## 2024-01-18 PROCEDURE — 6370000000 HC RX 637 (ALT 250 FOR IP): Performed by: REGISTERED NURSE

## 2024-01-18 PROCEDURE — 36415 COLL VENOUS BLD VENIPUNCTURE: CPT

## 2024-01-18 PROCEDURE — 83735 ASSAY OF MAGNESIUM: CPT

## 2024-01-18 PROCEDURE — 80048 BASIC METABOLIC PNL TOTAL CA: CPT

## 2024-01-18 PROCEDURE — 6360000002 HC RX W HCPCS: Performed by: EMERGENCY MEDICINE

## 2024-01-18 PROCEDURE — 2580000003 HC RX 258: Performed by: STUDENT IN AN ORGANIZED HEALTH CARE EDUCATION/TRAINING PROGRAM

## 2024-01-18 PROCEDURE — 2060000000 HC ICU INTERMEDIATE R&B

## 2024-01-18 PROCEDURE — 85025 COMPLETE CBC W/AUTO DIFF WBC: CPT

## 2024-01-18 RX ADMIN — ASPIRIN 81 MG 81 MG: 81 TABLET ORAL at 09:02

## 2024-01-18 RX ADMIN — NIMODIPINE 60 MG: 30 CAPSULE, LIQUID FILLED ORAL at 04:05

## 2024-01-18 RX ADMIN — NIMODIPINE 60 MG: 30 CAPSULE, LIQUID FILLED ORAL at 16:54

## 2024-01-18 RX ADMIN — ENOXAPARIN SODIUM 40 MG: 100 INJECTION SUBCUTANEOUS at 09:02

## 2024-01-18 RX ADMIN — SODIUM CHLORIDE, PRESERVATIVE FREE 10 ML: 5 INJECTION INTRAVENOUS at 20:56

## 2024-01-18 RX ADMIN — NIMODIPINE 60 MG: 30 CAPSULE, LIQUID FILLED ORAL at 09:02

## 2024-01-18 RX ADMIN — DOCUSATE SODIUM 100 MG: 100 CAPSULE, LIQUID FILLED ORAL at 09:03

## 2024-01-18 RX ADMIN — SODIUM CHLORIDE, PRESERVATIVE FREE 10 ML: 5 INJECTION INTRAVENOUS at 09:03

## 2024-01-18 RX ADMIN — NIMODIPINE 60 MG: 30 CAPSULE, LIQUID FILLED ORAL at 00:23

## 2024-01-18 RX ADMIN — NIMODIPINE 60 MG: 30 CAPSULE, LIQUID FILLED ORAL at 20:53

## 2024-01-18 RX ADMIN — NIMODIPINE 60 MG: 30 CAPSULE, LIQUID FILLED ORAL at 12:17

## 2024-01-18 NOTE — PROGRESS NOTES
Daily Progress Note  Neuro Critical Care    Patient Name: Miladys Baker  Patient : 1960  Room/Bed: 0141/0141-01  Code Status: Full code   Allergies: No Known Allergies    CHIEF COMPLAINT:      Headache, R MCA aneurysm      INTERVAL HISTORY      Initial Presentation (Admitted 24):  63 year old female with past medical history of TIA  (had 2 weeks of residual left facial weakness afterward which resolved), HTN who presented on  to urgent care with a rash, elevated /78, headache not relieved by Tylenol, SOB, flushing. She then went to the ED at [place] and BP was 134/74. T/CTA of the head was performed that demonstrated a right MCA 1.8 x 0.8x 0.6 cm narrow necked lobulated saccular aneurysm.  Patient was transferred to [place] for neuroendovascular and neurosurgical evaluation. On exam patient is neurologically intact no focal deficit.    Hospital Course:   : Admitted to [facility] for R MCA saccular aneurysm seen on CT and MRI measuring 1.8x 0.8x 0.6cm. Taken to cath with ARLYN for Web device placement   : Neurosurgery signed off. NEV recommend 3 days post op in [facility] followed by 4 days on floor for monitoring in setting of sentinel bleed.   : transferred to [facility]     : No acute issues overnight. -140s. Nighttime dose of Nimodipine was held for SBP in low 100s. Patient states she slept well, no complaints this AM. Afebrile.     Interval Events: No acute overnight events. One hypertensive reading  overnight, did not require PRN. Endovascular consulted (recommend monitoring for 7 days from presentation which was ). TCDs completed today. Patient expressed wish to return to work by Monday, states she cannot miss more work and that she will need a work note for days missed.     CURRENT MEDICATIONS:  SCHEDULED MEDICATIONS:   docusate sodium  100 mg Oral Daily    aspirin  81 mg Oral Daily    enoxaparin  40 mg SubCUTAneous Daily    sodium chloride flush  5-40 mL

## 2024-01-18 NOTE — PROGRESS NOTES
Endovascular Neurosurgery Progress Note    Pt Name: Miladys Baker  MRN: [id number]  YOB: 1960  Date of evaluation: 1/18/2024  Primary Care Physician: Abel Cuenca MD  Patient evaluated at the request of  Dr. Ott   Reason for evaluation: R MCA bifurcation giant aneurysm     SUBJECTIVE:     Doing well, no complaint, sitting up watching TV, feeling good today    History of Chief Complaint 1/12/2024:    Miladys Baker is a 63 y.o. female who presents with with sudden onset thunderclap headache with neck stiffness.  The patient denies any other focal neurological deficits.  CT head unrevealing for acute bleeding.  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     Of note, the patient has a very extensive significant family history of ruptured cerebral aneurysm (grandfather, father, paternal aunt who happened to be one of our patients as well)    The patient denies any history of vasculitis, kidney diseases, smoking.       Modified Luciano scale 0  Hunt and Ibarra 2    Review of Systems:  CONSTITUTIONAL:  negative for fevers, chills, fatigue and malaise    EYES:  negative for double vision, blurred vision and photophobia     HEENT:  negative for tinnitus, epistaxis and sore throat    RESPIRATORY:  negative for cough, shortness of breath, wheezing    CARDIOVASCULAR:  negative for chest pain, palpitations, syncope, edema    GASTROINTESTINAL:  negative for nausea, vomiting    GENITOURINARY:  negative for incontinence    MUSCULOSKELETAL:  negative for neck or back pain    NEUROLOGICAL:  Negative for weakness and tingling  negative for headaches and dizziness    PSYCHIATRIC:  negative for anxiety      Review of systems otherwise negative.      OBJECTIVE:   Vitals: /61   Pulse 64   Temp 98.4 °F (36.9 °C) (Oral)   Resp 20   Ht 1.6 m (5' 3\")   Wt 74.8 kg (165 lb)   SpO2 96%   BMI 29.23 kg/m²     Neuro Exam:  Mental  Status: Awake, alert oriented to person, place, and time. Follow commands   Language: Fluent, Coherent, Repetition Intact, No dysarthria  Cranial Nerves: PERRL, EOMI, VF intact, facial sensation intact, facial expression symmetric, hearing intact to finger rub bilaterally, symmetric palate elevation, shoulder shrug intact and symmetric, tongue protrudes midline  Motor:  Normal muscle bulk  Normal muscle tone  R : 5/5 upper and 5/5 lower extremity strength  L  : 5/5 upper and 5/5 lower extremity strength    Reflexes: 2+ throughout  Sensory: Intact  Cerebellar: FNF intact   Gait and Station: deferred        LABS:     Recent Labs     01/16/24  0423 01/17/24  0353 01/18/24  0514   WBC 5.6 5.4 5.1   HGB 10.5* 10.7* 10.8*   HCT 31.1* 32.6* 33.1*    206 230    139 139   K 3.6* 4.0 3.9    105 103   CO2 24 25 25   BUN 10 13 15   CREATININE 0.4* 0.5 0.6   MG 2.2 2.4 2.4   CALCIUM 8.7 8.9 8.9     No results for input(s): \"ALKPHOS\", \"ALT\", \"AST\", \"BILITOT\", \"BILIDIR\", \"LABALBU\", \"AMYLASE\", \"LIPASE\" in the last 72 hours.    RADIOLOGY:       2. There is a narrow necked lobulated saccular aneurysm arising from the  distal branching point of the right MCA measuring 1.8 x 0.8 x 0.6 cm.    Brain MRI 01/18/24  IMPRESSION:  1. No acute intracranial abnormality.  No acute infarct.  2. Mild chronic microvascular ischemic changes.  3. Large right MCA aneurysm better visualized on the recent CTA.      DSA 1/13/2024  Findings:  Please see dictated Radiology note for further details  R MCA bifurcation saccular laterally pointing aneurysm with irregular margin and daughter sac measuring  10.04 mm in length x 8.59 mm in width x 4.41 mm neck  The above lesion was treated using 6F cook shuttle 90cm, mccoy diagnostic catheter, stiff glide wire, Viky 6F 115cm, VIA 33 133cm, Synchro Support and WEB device 10mm x 5mm  Tucking technique was used to adjust the WEB device           Before:          AFTER          Abel score:

## 2024-01-18 NOTE — PLAN OF CARE
Problem: Discharge Planning  Goal: Discharge to home or other facility with appropriate resources  1/17/2024 2051 by Mahi Ortega, RN  Outcome: Progressing  1/17/2024 1651 by Meghan Partida RN  Outcome: Progressing     Problem: Safety - Adult  Goal: Free from fall injury  1/17/2024 2051 by Mahi Ortega, RN  Outcome: Progressing  1/17/2024 1651 by Meghan Partida, RN  Outcome: Progressing

## 2024-01-19 VITALS
TEMPERATURE: 98.1 F | OXYGEN SATURATION: 94 % | DIASTOLIC BLOOD PRESSURE: 58 MMHG | HEART RATE: 75 BPM | WEIGHT: 165 LBS | SYSTOLIC BLOOD PRESSURE: 135 MMHG | BODY MASS INDEX: 29.23 KG/M2 | HEIGHT: 63 IN | RESPIRATION RATE: 22 BRPM

## 2024-01-19 LAB
ECHO BSA: 1.82 M2
MAGNESIUM SERPL-MCNC: 2.4 MG/DL (ref 1.6–2.6)
VAS BASILAR MEAN VEL MANUAL: 48.5 CM/S
VAS LEFT DISTAL ICA MEAN VEL MANUAL: 44.3 CM/S
VAS LEFT SIPHON MEAN VEL MANUAL: 65.1 CM/S
VAS LEFT VERTEBRAL MEAN VEL MANUAL: 35.4 CM/S
VAS RIGHT DISTAL ICA MEAN VEL MANUAL: 45 CM/S
VAS RIGHT SIPHON MEAN VEL MANUAL: 66.6 CM/S
VAS RIGHT VERTEBRAL MEAN VEL MANUAL: 40 CM/S

## 2024-01-19 PROCEDURE — 6370000000 HC RX 637 (ALT 250 FOR IP): Performed by: EMERGENCY MEDICINE

## 2024-01-19 PROCEDURE — 36415 COLL VENOUS BLD VENIPUNCTURE: CPT

## 2024-01-19 PROCEDURE — 83735 ASSAY OF MAGNESIUM: CPT

## 2024-01-19 PROCEDURE — 6370000000 HC RX 637 (ALT 250 FOR IP): Performed by: REGISTERED NURSE

## 2024-01-19 PROCEDURE — 2580000003 HC RX 258: Performed by: STUDENT IN AN ORGANIZED HEALTH CARE EDUCATION/TRAINING PROGRAM

## 2024-01-19 RX ORDER — ASPIRIN 81 MG/1
81 TABLET, CHEWABLE ORAL DAILY
Qty: 30 TABLET | Refills: 3 | Status: SHIPPED | OUTPATIENT
Start: 2024-01-20

## 2024-01-19 RX ORDER — NIMODIPINE 30 MG/1
60 CAPSULE, LIQUID FILLED ORAL
Qty: 168 CAPSULE | Refills: 0 | Status: SHIPPED | OUTPATIENT
Start: 2024-01-19 | End: 2024-02-02

## 2024-01-19 RX ADMIN — ASPIRIN 81 MG 81 MG: 81 TABLET ORAL at 09:57

## 2024-01-19 RX ADMIN — NIMODIPINE 60 MG: 30 CAPSULE, LIQUID FILLED ORAL at 04:28

## 2024-01-19 RX ADMIN — NIMODIPINE 60 MG: 30 CAPSULE, LIQUID FILLED ORAL at 09:57

## 2024-01-19 RX ADMIN — DOCUSATE SODIUM 100 MG: 100 CAPSULE, LIQUID FILLED ORAL at 09:57

## 2024-01-19 RX ADMIN — SODIUM CHLORIDE, PRESERVATIVE FREE 10 ML: 5 INJECTION INTRAVENOUS at 09:59

## 2024-01-19 RX ADMIN — NIMODIPINE 60 MG: 30 CAPSULE, LIQUID FILLED ORAL at 16:28

## 2024-01-19 RX ADMIN — NIMODIPINE 60 MG: 30 CAPSULE, LIQUID FILLED ORAL at 00:31

## 2024-01-19 NOTE — PROGRESS NOTES
CLINICAL PHARMACY NOTE: MEDS TO BEDS    Total # of Prescriptions Filled: 2   The following medications were delivered to the patient:  Aspirin 81mg chew  Nimodipine 30mg    Additional Documentation: delivered to RN 1/19 at 5:45pm. Co-pay $188.00 clover. Patient in room 141.

## 2024-01-19 NOTE — PROGRESS NOTES
Daily Progress Note  Neuro Critical Care    Patient Name: Miladys Baker  Patient : 1960  Room/Bed: 0141/0141-01  Code Status: Full code   Allergies: No Known Allergies    CHIEF COMPLAINT:      Headache, R MCA aneurysm      INTERVAL HISTORY      Initial Presentation (Admitted 24):  63 year old female with past medical history of TIA  (had 2 weeks of residual left facial weakness afterward which resolved), HTN who presented on  to urgent care with a rash, elevated /78, headache not relieved by Tylenol, SOB, flushing. She then went to the ED at [place] and BP was 134/74. T/CTA of the head was performed that demonstrated a right MCA 1.8 x 0.8x 0.6 cm narrow necked lobulated saccular aneurysm.  Patient was transferred to [place] for neuroendovascular and neurosurgical evaluation. On exam patient is neurologically intact no focal deficit.    Hospital Course:   : Admitted to [facility] for R MCA saccular aneurysm seen on CT and MRI measuring 1.8x 0.8x 0.6cm. Taken to cath with ARLYN for Web device placement   : Neurosurgery signed off. NEV recommend 3 days post op in [facility] followed by 4 days on floor for monitoring in setting of sentinel bleed.   : transferred to [facility]     : No acute issues overnight. -140s. Nighttime dose of Nimodipine was held for SBP in low 100s. Patient states she slept well, no complaints this AM. Afebrile.     : No acute overnight events. One hypertensive reading  overnight, did not require PRN. Endovascular consulted (recommend monitoring for 7 days from presentation which was ). TCDs completed today. Patient expressed wish to return to work by Monday, states she cannot miss more work and that she will need a work note for days missed.     Interval History:   Alert, fully oriented. No acute overnight events. One hypertensive reading overnight 150/62; no PRN given Today is day 7 since presentation, DC today on 21 days total of  given  -PRN Labetalol  -Continue Nimodipine for total of 21 days  - Continue telemetry    PULMONARY:  -Saturating well on room air (94%)    RENAL/FLUID/ELECTROLYTE:  - BUN 15/ Creatinine 0.6  -K+ 3.9, Na+ 130  - Replace electrolytes PRN  - Daily BMP    GI/NUTRITION:  NUTRITION:  ADULT DIET; Regular  - Bowel regimen: Colace, Zofran   - GI prophylaxis: NA    ID:  - Tmax 98.6  - WBC 5.1  - Continue to monitor for fevers  - Daily CBC    HEME:   - H&H 10.8; 33.1   - Platelets 230   - Daily CBC    ENDOCRINE:  - Continue to monitor blood glucose, goal <180  - Glucose 105     OTHER:  - PT/OT/ST consulted   - Code Status: Full code     PROPHYLAXIS:  Stress ulcer: NA    DVT PROPHYLAXIS:  Lovenox 40 mg SC daily     DISPOSITION:  [] To remain ICU:   [x] OK for out of ICU from Neuro Critical Care standpoint    We will continue to follow along.     For any changes in exam or patient status please contact Neuro Critical Care.      Estella Green MD  Neuro Critical Care  1/19/2024     4:33 PM

## 2024-01-19 NOTE — DISCHARGE INSTR - COC
Continuity of Care Form    Patient Name: Miladys Baker   :  1960  MRN:  [id number]    Admit date:  2024  Discharge date:  ***    Code Status Order: Full Code   Advance Directives:     Admitting Physician:  Ebenezer Ott MD  PCP: Abel Cuenca MD    Discharging Nurse: ***  Discharging Hospital Unit/Room#: 0141/0141-01  Discharging Unit Phone Number: ***    Emergency Contact:   Extended Emergency Contact Information  Primary Emergency Contact: Miguel Baker  Home Phone: [phone]  Mobile Phone: [phone]  Relation: Child  Secondary Emergency Contact: Marta Velasco  Home Phone: [phone]  Relation: Other Relative    Past Surgical History:  Past Surgical History:   Procedure Laterality Date    BREAST CYST EXCISION Right     CEREBRAL ANGIOGRAM Bilateral 2024    CAROTID CEREBRAL     SECTION  1980    COLONOSCOPY N/A 2023    COLONOSCOPY POLYPECTOMY SNARE/COLD BIOPSY performed by Janeen Tanner MD at [facility] END[facility]    ENDOMETRIAL ABLATION      SKIN CANCER EXCISION         Immunization History:   Immunization History   Administered Date(s) Administered    COVID-19, MODERNA Bivalent, (age 12y+), IM, 50 mcg/0.5 mL 10/18/2022    Influenza, FLUCELVAX, (age 6 mo+), MDCK, PF, 0.5mL 10/04/2023    Pneumococcal, PPSV23, PNEUMOVAX 23, (age 2y+), SC/IM, 0.5mL 10/30/2023    TDaP, ADACEL (age 10y-64y), BOOSTRIX (age 10y+), IM, 0.5mL 10/30/2023       Active Problems:  Patient Active Problem List   Diagnosis Code    HTN (hypertension) I10    Intracranial aneurysm I67.1    Cerebral aneurysm I67.1    Aneurysm (HCC) I72.9       Isolation/Infection:   Isolation            Contact          Patient Infection Status       Infection Onset Added Last Indicated Last Indicated By Review Planned Expiration Resolved Resolved By    ESBL (Extended Spectrum Beta Lactamase) 23 Culture, Urine                Nurse Assessment:  Last Vital Signs: BP (!) 135/58   Pulse 75   Temp 98.1 °F (36.7  °C) (Oral)   Resp 22   Ht 1.6 m (5' 3\")   Wt 74.8 kg (165 lb)   SpO2 94%   BMI 29.23 kg/m²     Last documented pain score (0-10 scale): Pain Level: 0  Last Weight:   Wt Readings from Last 1 Encounters:   24 74.8 kg (165 lb)     Mental Status:  {IP PT MENTAL STATUS:76070}    IV Access:  { DWAIN IV ACCESS:468606494}    Nursing Mobility/ADLs:  Walking   {CHP DME ADLs:898649738}  Transfer  {CHP DME ADLs:856808835}  Bathing  {CHP DME ADLs:821198927}  Dressing  {CHP DME ADLs:555021607}  Toileting  {CHP DME ADLs:610070438}  Feeding  {CHP DME ADLs:882055067}  Med Admin  {P DME ADLs:105672192}  Med Delivery   { DWAIN MED Delivery:226412710}    Wound Care Documentation and Therapy:        Elimination:  Continence:   Bowel: {YES / NO:}  Bladder: {YES / NO:}  Urinary Catheter: {Urinary Catheter:418336601}   Colostomy/Ileostomy/Ileal Conduit: {YES / NO:}       Date of Last BM: ***  No intake or output data in the 24 hours ending 24 1645  No intake/output data recorded.    Safety Concerns:     { DWAIN Safety Concerns:704444615}    Impairments/Disabilities:      { DWAIN Impairments/Disabilities:034416168}    Nutrition Therapy:  Current Nutrition Therapy:   { DWAIN Diet List:650351854}    Routes of Feeding: {[facility] DME Other Feedings:443072344}  Liquids: {Slp liquid thickness:57910}  Daily Fluid Restriction: {CHP DME Yes amt example:465693743}  Last Modified Barium Swallow with Video (Video Swallowing Test): {Done Not Done Date:}    Treatments at the Time of Hospital Discharge:   Respiratory Treatments: ***  Oxygen Therapy:  {Therapy; copd oxygen:56661}  Ventilator:    { CC Vent List:411514113}    Rehab Therapies: {THERAPEUTIC INTERVENTION:0725827816}  Weight Bearing Status/Restrictions: {[facility] Weight Bearin}  Other Medical Equipment (for information only, NOT a DME order):  {EQUIPMENT:975930373}  Other Treatments: ***    Patient's personal belongings (please select all that are

## 2024-01-19 NOTE — PROGRESS NOTES
AFTER          Abel score: class I     WEB Occlusion Scale (WOS): A    IMPRESSIONS:     Miladys is a 63-year-old lady with past medical history significant for TIA and hypertension who presented with sudden onset thunderclap headache and neck stiffness.  She was found to have a giant right MCA aneurysm measuring 1.8 x 1.8 x 0.6 cm.  The patient has a significant family history of ruptured cerebral aneurysms.     Dayne Ibarra is 2  Modified Luciano scale 0    Impression: sentinel SAH with hostile appearing giant right MCA aneurysm.     The patient underwent WEB device embolization on 1/13/2024 with uneventful course, achieving WOS A and RR 1.       PLANS:   - Continue with aspirin 81mg daily  - ok to be discharged from endovascular standpoint today      - Follow up with Peter Lee MD  2 weeks after discharge and Dr. Garcia 3 months after discharge.    Discussed with Dr Garcia attending    Addison Fritz MD PGY 8  Endovascular Neurosurgery Fellow   Stroke, Neurocritical Care & Neurointervention  [facility]  [facility] [place]  10:29 AM

## 2024-01-19 NOTE — CARE COORDINATION
Transitional Planning  Met with patient.  Patient denies needs.  Plan to return home with assistance from family.  Patient states she has transportation and denies further needs.    PS sent to Dr. Ott, [place] and Yanna العراقي.  No response.

## 2024-01-22 ENCOUNTER — TELEPHONE (OUTPATIENT)
Dept: INTERNAL MEDICINE CLINIC | Age: 64
End: 2024-01-22

## 2024-01-22 NOTE — TELEPHONE ENCOUNTER
Care Transitions Initial Follow Up Call    Outreach made within 2 business days of discharge: Yes    Patient: Miladys Baker Patient : 1960   MRN: [id number]  Reason for Admission: There are no discharge diagnoses documented for the most recent discharge.  Discharge Date: 24       Spoke with: patient    Discharge department/facility: [facility] Interactive Patient Contact:  Was patient able to fill all prescriptions: Yes  Was patient instructed to bring all medications to the follow-up visit: Yes  Is patient taking all medications as directed in the discharge summary? Yes  Does patient understand their discharge instructions: Yes  Does patient have questions or concerns that need addressed prior to 7-14 day follow up office visit: no    Scheduled appointment with PCP within 7-14 days    Follow Up  Future Appointments   Date Time Provider Department Center   2024 10:30 AM Mick Hobson MD [facility]   2024 10:15 AM Pilar Sifuentes [facility] OB/Gyn [facility]   2024  2:00 PM Peter Evangelista MD [facility]   2024  3:30 PM Kaley Garcia MD Neuro Endo [facility]       Ana Maria Jernigan MA

## 2024-01-23 NOTE — DISCHARGE SUMMARY
Neuro Critical Care   Discharge Summary      PATIENT NAME: Miladys Baker  YOB: 1960  MEDICAL RECORD NO. [id number]  DATE: 1/19/2024  PRIMARY CARE PHYSICIAN: Abel Cuenca MD  DISCHARGE DATE:  ***  DISCHARGE DIAGNOSIS:   Patient Active Problem List   Diagnosis Code    HTN (hypertension) I10    Intracranial aneurysm I67.1    Cerebral aneurysm I67.1    Aneurysm (HCC) I72.9       HOSPITAL COURSE     Miladys Baker is a 63 y.o. yo female who presented to [facility] on 1/12/2024 11:09 PM with ***.      Labs and imaging were followed daily.  At time of discharge, Miladys Baker was tolerating a ADULT DIET; Regular, having bowel movements, and is in *** condition to be discharged to ***.        PROCEDURES:    ***    PHYSICAL EXAMINATION        Discharge Vitals:  height is 1.6 m (5' 3\") and weight is 74.8 kg (165 lb). Her oral temperature is 98.1 °F (36.7 °C). Her blood pressure is 135/58 (abnormal) and her pulse is 75. Her respiration is 22 and oxygen saturation is 94%.     CONSTITUTIONAL:  Well developed, well nourished, alert and oriented x 3, in no acute distress. GCS 15. Nontoxic. No dysarthria***. No aphasia***.   HEAD:  normocephalic, atraumatic    EYES:  PERRLA, EOMI.   ENT:  moist mucous membranes   NECK:  supple, symmetric   LUNGS:  Equal air entry bilaterally   CARDIOVASCULAR:  normal s1 / s2, RRR, distal pulses intact   ABDOMEN:  Soft, no rigidity   NEUROLOGIC:  Mental Status:  {MS AMB PSYCH MSE ALERTNESS:18591},{ALERTNESS:073168394::\"awake\"}             Cranial Nerves:    {CRANIAL NERVES:360680621}    Motor Exam:    Drift:  {ABSENT/PRESENT:628160565::\"absent\"}  Tone:  {NORMAL/ABNORMAL:937792613::\"normal\"}    {MOTOR:248913367}    Sensory:    Touch:    Right Upper Extremity:  {NORMAL/ABNORMAL:726705621::\"normal\"}  Left Upper Extremity:  {NORMAL/ABNORMAL:745981480::\"normal\"}  Right Lower Extremity:  {NORMAL/ABNORMAL:396307624::\"normal\"}  Left Lower Extremity:  
dissection, arterial injury, or significant stenosis. SOFT TISSUES: The lung apices are clear.  No cervical or superior mediastinal lymphadenopathy.  The larynx and pharynx are unremarkable.  No acute abnormality of the salivary glands.  Right lobe thyroid nodule measures 2.3 cm. BONES: No acute osseous abnormality. CTA HEAD: ANTERIOR CIRCULATION: There is a narrow necked lobulated saccular aneurysm arising from the distal branching point of the right MCA measuring 1.8 x 0.8 x 0.6 cm.  No significant stenosis or occlusion of the intracranial internal carotid, anterior cerebral, or middle cerebral arteries. POSTERIOR CIRCULATION: No significant stenosis of the vertebral, basilar, or posterior cerebral arteries. No aneurysm. OTHER: No dural venous sinus thrombosis on this non-dedicated study. BRAIN: No mass effect or midline shift. No extra-axial fluid collection. The gray-white differentiation is maintained.     1. No acute intracranial abnormality. 2. There is a narrow necked lobulated saccular aneurysm arising from the distal branching point of the right MCA measuring 1.8 x 0.8 x 0.6 cm. 3. No evidence of arterial stenosis or occlusion in the major arterial vessels of the head and neck. 4. Right lobe thyroid nodule measuring 2.3 cm.  Recommend non emergent thyroid ultrasound.         DISCHARGE INSTRUCTIONS     Discharge Medications:        Medication List        START taking these medications      aspirin 81 MG chewable tablet  Take 1 tablet by mouth daily     niMODipine 30 MG capsule  Commonly known as: NIMOTOP  Take 2 capsules by mouth 6 times daily for 14 days 60 mg, Oral, EVERY 4 HOURS SCHEDULED (6 times per day)            CONTINUE taking these medications      albuterol sulfate  (90 Base) MCG/ACT inhaler  Commonly known as: PROVENTIL;VENTOLIN;PROAIR  Inhale 2 puffs into the lungs every 6 hours as needed for Wheezing     carvedilol 3.125 MG tablet  Commonly known as: Coreg  Take 1 tablet by mouth 2

## 2024-01-24 ENCOUNTER — OFFICE VISIT (OUTPATIENT)
Dept: INTERNAL MEDICINE CLINIC | Age: 64
End: 2024-01-24

## 2024-01-24 VITALS
HEIGHT: 63 IN | SYSTOLIC BLOOD PRESSURE: 134 MMHG | HEART RATE: 72 BPM | DIASTOLIC BLOOD PRESSURE: 72 MMHG | OXYGEN SATURATION: 97 % | BODY MASS INDEX: 31.54 KG/M2 | WEIGHT: 178 LBS

## 2024-01-24 DIAGNOSIS — J45.20 MILD INTERMITTENT ASTHMA WITHOUT COMPLICATION: ICD-10-CM

## 2024-01-24 DIAGNOSIS — R01.1 MURMUR, CARDIAC: ICD-10-CM

## 2024-01-24 DIAGNOSIS — Z09 HOSPITAL DISCHARGE FOLLOW-UP: ICD-10-CM

## 2024-01-24 DIAGNOSIS — I67.1 INTRACRANIAL ANEURYSM: Primary | ICD-10-CM

## 2024-01-24 DIAGNOSIS — D50.9 IRON DEFICIENCY ANEMIA, UNSPECIFIED IRON DEFICIENCY ANEMIA TYPE: ICD-10-CM

## 2024-01-24 DIAGNOSIS — I10 PRIMARY HYPERTENSION: ICD-10-CM

## 2024-01-24 PROBLEM — I72.9 ANEURYSM (HCC): Status: RESOLVED | Noted: 2024-01-13 | Resolved: 2024-01-24

## 2024-01-24 PROBLEM — J45.909 ASTHMA: Status: ACTIVE | Noted: 2024-01-24

## 2024-01-24 RX ORDER — PREDNISONE 10 MG/1
TABLET ORAL
COMMUNITY
Start: 2024-01-11 | End: 2024-01-24

## 2024-01-24 ASSESSMENT — ENCOUNTER SYMPTOMS
COLOR CHANGE: 0
SHORTNESS OF BREATH: 0
DIARRHEA: 0
TROUBLE SWALLOWING: 0
EYE PAIN: 0
COUGH: 0
BLOOD IN STOOL: 0
ABDOMINAL DISTENTION: 0
WHEEZING: 0
EYE DISCHARGE: 0

## 2024-01-24 ASSESSMENT — PATIENT HEALTH QUESTIONNAIRE - PHQ9
SUM OF ALL RESPONSES TO PHQ QUESTIONS 1-9: 0
1. LITTLE INTEREST OR PLEASURE IN DOING THINGS: 0
SUM OF ALL RESPONSES TO PHQ9 QUESTIONS 1 & 2: 0
2. FEELING DOWN, DEPRESSED OR HOPELESS: 0
SUM OF ALL RESPONSES TO PHQ QUESTIONS 1-9: 0

## 2024-01-24 NOTE — PROGRESS NOTES
Visit Information    Have you changed or started any medications since your last visit including any over-the-counter medicines, vitamins, or herbal medicines? no   Are you having any side effects from any of your medications? -  no  Have you stopped taking any of your medications? Is so, why? -  no    Have you seen any other physician or provider since your last visit? Yes - Records Obtained  Have you had any other diagnostic tests since your last visit? Yes - Records Obtained  Have you been seen in the emergency room and/or had an admission to a hospital since we last saw you? Yes - Records Obtained  Have you had your routine dental cleaning in the past 6 months? no    Have you activated your [employer] account? If not, what are your barriers? Yes     Patient Care Team:  Abel Cuenca MD as PCP - General (Internal Medicine)  Abel Cuenca MD as PCP - Empaneled Provider    Medical History Review  Past Medical, Family, and Social History reviewed and does contribute to the patient presenting condition    Health Maintenance   Topic Date Due    HIV screen  Never done    Hepatitis C screen  Never done    Shingles vaccine (1 of 2) Never done    Respiratory Syncytial Virus (RSV) Pregnant or age 60 yrs+ (1 - 1-dose 60+ series) Never done    COVID-19 Vaccine (2 - 2023-24 season) 09/01/2023    Annual Wellness Visit (Medicare Advantage)  Never done    Depression Screen  10/04/2024    Pneumococcal 65+ years Vaccine (2 - PCV) 10/30/2024    Breast cancer screen  10/18/2025    Colorectal Cancer Screen  11/29/2026    Diabetes screen  01/13/2027    Cervical cancer screen  11/16/2028    Lipids  01/15/2029    DTaP/Tdap/Td vaccine (2 - Td or Tdap) 10/30/2033    DEXA (modify frequency per FRAX score)  Completed    Flu vaccine  Completed    Hepatitis A vaccine  Aged Out    Hepatitis B vaccine  Aged Out    Hib vaccine  Aged Out    Polio vaccine  Aged Out    Meningococcal (ACWY) vaccine  Aged Out    Pneumococcal 0-64 years Vaccine  
Meningococcal (ACWY) vaccine  Aged Out    Pneumococcal 0-64 years Vaccine  Discontinued       Subjective:     Review of Systems   Constitutional:  Negative for appetite change, diaphoresis and fatigue.   HENT:  Negative for ear discharge and trouble swallowing.    Eyes:  Negative for pain and discharge.   Respiratory:  Negative for cough, shortness of breath and wheezing.    Cardiovascular:  Negative for chest pain and palpitations.   Gastrointestinal:  Negative for abdominal distention, blood in stool and diarrhea.   Endocrine: Negative for polydipsia and polyphagia.   Genitourinary:  Negative for difficulty urinating and frequency.   Musculoskeletal:  Negative for gait problem, myalgias and neck pain.   Skin:  Negative for color change and rash.   Allergic/Immunologic: Negative for environmental allergies and food allergies.   Neurological:  Negative for dizziness and headaches.   Hematological:  Negative for adenopathy. Does not bruise/bleed easily.   Psychiatric/Behavioral:  Negative for behavioral problems and sleep disturbance.        Objective:     Physical Exam  Constitutional:       Appearance: She is well-developed. She is not diaphoretic.   HENT:      Head: Normocephalic and atraumatic.   Eyes:      General:         Right eye: No discharge.         Left eye: No discharge.      Extraocular Movements:      Right eye: Normal extraocular motion.      Left eye: Normal extraocular motion.      Conjunctiva/sclera: Conjunctivae normal.      Right eye: Right conjunctiva is not injected.      Left eye: Left conjunctiva is not injected.   Neck:      Thyroid: No thyroid mass or thyromegaly.      Vascular: No JVD.   Cardiovascular:      Rate and Rhythm: Normal rate and regular rhythm.      Heart sounds: No murmur heard.     No friction rub.   Pulmonary:      Effort: Pulmonary effort is normal. No tachypnea, bradypnea, accessory muscle usage or respiratory distress.      Breath sounds: Normal breath sounds. No wheezing

## 2024-01-26 ENCOUNTER — HOSPITAL ENCOUNTER (OUTPATIENT)
Age: 64
Discharge: HOME | End: 2024-01-26
Payer: MEDICARE

## 2024-01-26 DIAGNOSIS — D50.9 IRON DEFICIENCY ANEMIA, UNSPECIFIED IRON DEFICIENCY ANEMIA TYPE: ICD-10-CM

## 2024-01-26 LAB
FOLATE SERPL-MCNC: 11.8 NG/ML
IRON SATN MFR SERPL: 19 % (ref 20–55)
IRON SERPL-MCNC: 61 UG/DL (ref 37–145)
TIBC SERPL-MCNC: 314 UG/DL (ref 250–450)
UNSATURATED IRON BINDING CAPACITY: 253 UG/DL (ref 112–347)
VIT B12 SERPL-MCNC: 306 PG/ML (ref 232–1245)

## 2024-01-26 PROCEDURE — 82746 ASSAY OF FOLIC ACID SERUM: CPT

## 2024-01-26 PROCEDURE — 83550 IRON BINDING TEST: CPT

## 2024-01-26 PROCEDURE — 82607 VITAMIN B-12: CPT

## 2024-01-26 PROCEDURE — 83516 IMMUNOASSAY NONANTIBODY: CPT

## 2024-01-26 PROCEDURE — 36415 COLL VENOUS BLD VENIPUNCTURE: CPT

## 2024-01-26 PROCEDURE — 83540 ASSAY OF IRON: CPT

## 2024-01-26 PROCEDURE — 82784 ASSAY IGA/IGD/IGG/IGM EACH: CPT

## 2024-01-28 LAB
GLIADIN IGA SER IA-ACNC: 1.6 U/ML
GLIADIN IGG SER IA-ACNC: <0.4 U/ML
IGA SERPL-MCNC: 289 MG/DL (ref 70–400)
TTG IGA SER IA-ACNC: 0.8 U/ML

## 2024-02-02 ENCOUNTER — HOSPITAL ENCOUNTER (OUTPATIENT)
Age: 64
Setting detail: SPECIMEN
Discharge: HOME | End: 2024-02-02

## 2024-02-02 ENCOUNTER — PROCEDURE VISIT (OUTPATIENT)
Dept: OBGYN CLINIC | Age: 64
End: 2024-02-02

## 2024-02-02 VITALS
SYSTOLIC BLOOD PRESSURE: 140 MMHG | DIASTOLIC BLOOD PRESSURE: 80 MMHG | WEIGHT: 178 LBS | BODY MASS INDEX: 32.76 KG/M2 | HEIGHT: 62 IN

## 2024-02-02 DIAGNOSIS — R87.616 TRANSFORMATION ZONE ABSENT ON CERVICAL PAP SMEAR: ICD-10-CM

## 2024-02-02 DIAGNOSIS — R87.610 ASCUS WITH POSITIVE HIGH RISK HPV CERVICAL: Primary | ICD-10-CM

## 2024-02-02 DIAGNOSIS — N84.1 CERVICAL POLYP: ICD-10-CM

## 2024-02-02 DIAGNOSIS — R87.810 ASCUS WITH POSITIVE HIGH RISK HPV CERVICAL: Primary | ICD-10-CM

## 2024-02-02 NOTE — PROGRESS NOTES
evaluation.       -Endocervical/transformation zone component is absent.  Descriptive Diagnosis:       Atypical squamous cells of undetermined significance (ASC-US).   Comments:       Specimen was screened at [facility], [address]. [facility] [address]      [employer] Screener:  GAIL     **Electronically Signed Out**         Tatiana Rowland M.D.  kmg2/12/5/2023    Procedure/Addendum  HPV Procedure Report       Date Ordered:     11/20/2023     Status: Signed Out       Date Complete:     11/21/2023     By: System Interface       Date Reported:     11/21/2023              Sample:  HPV Type 16      Result:   Not Detected      Ref Range:  (Not Detected)  Sample:  HPV Type 18      Result:   Not Detected      Ref Range: (Not  Detected)  Sample:  Other High Risk HPV      Result:   DETECTED      Ref Range:  (Not Detected)  Sample:  HPV Interp      Result:         Ref Range: (Not Detected)  This test amplifies and detects DNA of 14 high-risk HPV types  associated with cervical cancer and its precursor lesions   (HPV types 16,18, 31, 33, 35, 39, 45, 51, 52, 56, 58, 59, 66, and  68).        Sensitivity may be affected by specimen collection methods, stage of  infection, and the presence of interfering   substances. Results should be interpreted in conjunction with other  available laboratory and clinical data. A negative   high-risk HPV result does not exclude the possibility of future  cytologic HSIL or underlying CIN2-3 or cancer.        This test is intended for medical purposes only and is not valid for  the evaluation of suspected sexual abuse or for   other forensic purposes. Performed at [facility], [address] [place] [id number]  [phone].               Source of Specimen:  A: Imaged ThinPrep Pap - Cervical (1 monolayer slide)    HPV Reflex?......................HPV Regardless      Clinical History  Postmenopausal  Endometrial ablation  Z01.419 Routine gyn exam without abnormal

## 2024-02-05 ENCOUNTER — TELEPHONE (OUTPATIENT)
Dept: NEUROLOGY | Age: 64
End: 2024-02-05

## 2024-02-05 NOTE — TELEPHONE ENCOUNTER
Pt's appt for 2/9/24 got cancelled and rescheduled for 3/15/24. Pt is concerned with going back to work as she states\" she has used up the majority of her finances\" and would like to RTW ASAP. Would Dr. Evangelista feel comfortable writing a litter for her to RTW without seeing her in office first.    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 Would like to RTW on 2/13 or 2/14. Please advise.

## 2024-02-07 LAB — SURGICAL PATHOLOGY REPORT: NORMAL

## 2024-02-08 ENCOUNTER — TELEPHONE (OUTPATIENT)
Dept: OBGYN CLINIC | Age: 64
End: 2024-02-08

## 2024-02-08 ENCOUNTER — HOSPITAL ENCOUNTER (OUTPATIENT)
Age: 64
Discharge: HOME | End: 2024-02-10
Attending: INTERNAL MEDICINE
Payer: MEDICARE

## 2024-02-08 VITALS
SYSTOLIC BLOOD PRESSURE: 140 MMHG | HEART RATE: 72 BPM | DIASTOLIC BLOOD PRESSURE: 80 MMHG | BODY MASS INDEX: 32.76 KG/M2 | WEIGHT: 178 LBS | HEIGHT: 62 IN

## 2024-02-08 DIAGNOSIS — R01.1 MURMUR, CARDIAC: ICD-10-CM

## 2024-02-08 LAB
ECHO AO ROOT DIAM: 2.7 CM
ECHO AO ROOT INDEX: 1.48 CM/M2
ECHO AR MAX VEL PISA: 4.5 M/S
ECHO AV AREA PEAK VELOCITY: 1.5 CM2
ECHO AV AREA VTI: 1.6 CM2
ECHO AV AREA/BSA PEAK VELOCITY: 0.8 CM2/M2
ECHO AV AREA/BSA VTI: 0.9 CM2/M2
ECHO AV MEAN GRADIENT: 8 MMHG
ECHO AV MEAN VELOCITY: 1.3 M/S
ECHO AV PEAK GRADIENT: 14 MMHG
ECHO AV PEAK VELOCITY: 1.9 M/S
ECHO AV REGURGITANT PHT: 535 MS
ECHO AV VELOCITY RATIO: 0.58
ECHO AV VTI: 39.6 CM
ECHO BSA: 1.88 M2
ECHO LA AREA 2C: 16 CM2
ECHO LA AREA 4C: 15.9 CM2
ECHO LA DIAMETER INDEX: 2.25 CM/M2
ECHO LA DIAMETER: 4.1 CM
ECHO LA MAJOR AXIS: 5.6 CM
ECHO LA MINOR AXIS: 5.5 CM
ECHO LA TO AORTIC ROOT RATIO: 1.52
ECHO LA VOL BP: 37 ML (ref 22–52)
ECHO LA VOL MOD A2C: 38 ML (ref 22–52)
ECHO LA VOL MOD A4C: 36 ML (ref 22–52)
ECHO LA VOL/BSA BIPLANE: 20 ML/M2 (ref 16–34)
ECHO LA VOLUME INDEX MOD A2C: 21 ML/M2 (ref 16–34)
ECHO LA VOLUME INDEX MOD A4C: 20 ML/M2 (ref 16–34)
ECHO LV E' LATERAL VELOCITY: 9 CM/S
ECHO LV E' SEPTAL VELOCITY: 8 CM/S
ECHO LV FRACTIONAL SHORTENING: 33 % (ref 28–44)
ECHO LV INTERNAL DIMENSION DIASTOLE INDEX: 2.31 CM/M2
ECHO LV INTERNAL DIMENSION DIASTOLIC: 4.2 CM (ref 3.9–5.3)
ECHO LV INTERNAL DIMENSION SYSTOLIC INDEX: 1.54 CM/M2
ECHO LV INTERNAL DIMENSION SYSTOLIC: 2.8 CM
ECHO LV IVSD: 1.4 CM (ref 0.6–0.9)
ECHO LV MASS 2D: 236.7 G (ref 67–162)
ECHO LV MASS INDEX 2D: 130.1 G/M2 (ref 43–95)
ECHO LV POSTERIOR WALL DIASTOLIC: 1.5 CM (ref 0.6–0.9)
ECHO LV RELATIVE WALL THICKNESS RATIO: 0.71
ECHO LVOT AREA: 2.5 CM2
ECHO LVOT AV VTI INDEX: 0.62
ECHO LVOT DIAM: 1.8 CM
ECHO LVOT MEAN GRADIENT: 3 MMHG
ECHO LVOT PEAK GRADIENT: 5 MMHG
ECHO LVOT PEAK VELOCITY: 1.1 M/S
ECHO LVOT STROKE VOLUME INDEX: 34.2 ML/M2
ECHO LVOT SV: 62.3 ML
ECHO LVOT VTI: 24.5 CM
ECHO MV A VELOCITY: 1.17 M/S
ECHO MV AREA VTI: 1.4 CM2
ECHO MV E DECELERATION TIME (DT): 320 MS
ECHO MV E VELOCITY: 0.88 M/S
ECHO MV E/A RATIO: 0.75
ECHO MV E/E' LATERAL: 9.78
ECHO MV E/E' RATIO (AVERAGED): 10.39
ECHO MV LVOT VTI INDEX: 1.83
ECHO MV MAX VELOCITY: 1.2 M/S
ECHO MV MEAN GRADIENT: 2 MMHG
ECHO MV MEAN VELOCITY: 0.7 M/S
ECHO MV PEAK GRADIENT: 6 MMHG
ECHO MV VTI: 44.8 CM
ECHO RA AREA 4C: 12.4 CM2
ECHO RA END SYSTOLIC VOLUME APICAL 4 CHAMBER INDEX BSA: 14 ML/M2
ECHO RA VOLUME: 25 ML
ECHO RV TAPSE: 2.1 CM (ref 1.7–?)
ECHO TV REGURGITANT MAX VELOCITY: 2.18 M/S
ECHO TV REGURGITANT PEAK GRADIENT: 19 MMHG

## 2024-02-08 PROCEDURE — 93306 TTE W/DOPPLER COMPLETE: CPT

## 2024-02-08 NOTE — TELEPHONE ENCOUNTER
Per Dr Armendariz patient to repeat pap in 6 months. Patient notified and said she would call back to make an appointment.

## 2024-02-22 ENCOUNTER — TELEPHONE (OUTPATIENT)
Dept: INTERNAL MEDICINE CLINIC | Age: 64
End: 2024-02-22

## 2024-02-22 RX ORDER — IBUPROFEN 600 MG/1
600 TABLET ORAL EVERY 6 HOURS PRN
Qty: 120 TABLET | Refills: 0 | Status: SHIPPED | OUTPATIENT
Start: 2024-02-22

## 2024-02-22 NOTE — TELEPHONE ENCOUNTER
Patient called and stated she woke up to hives around her eyes. No available openings for today. Advised patient to go to the walk-in.

## 2024-02-28 ENCOUNTER — TELEPHONE (OUTPATIENT)
Dept: INTERNAL MEDICINE CLINIC | Age: 64
End: 2024-02-28

## 2024-02-28 ENCOUNTER — OFFICE VISIT (OUTPATIENT)
Dept: INTERNAL MEDICINE CLINIC | Age: 64
End: 2024-02-28
Payer: COMMERCIAL

## 2024-02-28 ENCOUNTER — HOSPITAL ENCOUNTER (OUTPATIENT)
Age: 64
Setting detail: SPECIMEN
Discharge: HOME | End: 2024-02-28

## 2024-02-28 VITALS
BODY MASS INDEX: 32.56 KG/M2 | OXYGEN SATURATION: 96 % | HEART RATE: 74 BPM | DIASTOLIC BLOOD PRESSURE: 88 MMHG | WEIGHT: 178 LBS | SYSTOLIC BLOOD PRESSURE: 150 MMHG

## 2024-02-28 DIAGNOSIS — R22.0 SWELLING OF FACE: ICD-10-CM

## 2024-02-28 DIAGNOSIS — T78.40XA ALLERGY, INITIAL ENCOUNTER: Primary | ICD-10-CM

## 2024-02-28 DIAGNOSIS — R22.0 SWELLING OF FACE: Primary | ICD-10-CM

## 2024-02-28 LAB
ALBUMIN SERPL-MCNC: 4.1 G/DL (ref 3.5–5.2)
ALBUMIN/GLOB SERPL: 1.4 {RATIO} (ref 1–2.5)
ALP SERPL-CCNC: 90 U/L (ref 35–104)
ALT SERPL-CCNC: 17 U/L (ref 5–33)
ANION GAP SERPL CALCULATED.3IONS-SCNC: 9 MMOL/L (ref 9–17)
AST SERPL-CCNC: 21 U/L
BASOPHILS # BLD: 0.04 K/UL (ref 0–0.2)
BASOPHILS NFR BLD: 1 % (ref 0–2)
BILIRUB SERPL-MCNC: 0.3 MG/DL (ref 0.3–1.2)
BUN SERPL-MCNC: 9 MG/DL (ref 8–23)
CALCIUM SERPL-MCNC: 9.4 MG/DL (ref 8.6–10.4)
CHLORIDE SERPL-SCNC: 105 MMOL/L (ref 98–107)
CO2 SERPL-SCNC: 26 MMOL/L (ref 20–31)
CREAT SERPL-MCNC: 0.6 MG/DL (ref 0.5–0.9)
EOSINOPHIL # BLD: 0.09 K/UL (ref 0–0.44)
EOSINOPHILS RELATIVE PERCENT: 2 % (ref 1–4)
ERYTHROCYTE [DISTWIDTH] IN BLOOD BY AUTOMATED COUNT: 12.3 % (ref 11.8–14.4)
GFR SERPL CREATININE-BSD FRML MDRD: >60 ML/MIN/1.73M2
GLUCOSE SERPL-MCNC: 84 MG/DL (ref 70–99)
HCT VFR BLD AUTO: 39.5 % (ref 36.3–47.1)
HGB BLD-MCNC: 12.5 G/DL (ref 11.9–15.1)
IMM GRANULOCYTES # BLD AUTO: <0.03 K/UL (ref 0–0.3)
IMM GRANULOCYTES NFR BLD: 0 %
LYMPHOCYTES NFR BLD: 1.63 K/UL (ref 1.1–3.7)
LYMPHOCYTES RELATIVE PERCENT: 30 % (ref 24–43)
MCH RBC QN AUTO: 29.3 PG (ref 25.2–33.5)
MCHC RBC AUTO-ENTMCNC: 31.6 G/DL (ref 28.4–34.8)
MCV RBC AUTO: 92.5 FL (ref 82.6–102.9)
MONOCYTES NFR BLD: 0.45 K/UL (ref 0.1–1.2)
MONOCYTES NFR BLD: 8 % (ref 3–12)
NEUTROPHILS NFR BLD: 59 % (ref 36–65)
NEUTS SEG NFR BLD: 3.29 K/UL (ref 1.5–8.1)
NRBC BLD-RTO: 0 PER 100 WBC
PLATELET # BLD AUTO: 318 K/UL (ref 138–453)
PMV BLD AUTO: 9.6 FL (ref 8.1–13.5)
POTASSIUM SERPL-SCNC: 4.3 MMOL/L (ref 3.7–5.3)
PROT SERPL-MCNC: 7.1 G/DL (ref 6.4–8.3)
RBC # BLD AUTO: 4.27 M/UL (ref 3.95–5.11)
SODIUM SERPL-SCNC: 140 MMOL/L (ref 135–144)
WBC OTHER # BLD: 5.5 K/UL (ref 3.5–11.3)

## 2024-02-28 PROCEDURE — 3079F DIAST BP 80-89 MM HG: CPT | Performed by: INTERNAL MEDICINE

## 2024-02-28 PROCEDURE — 96372 THER/PROPH/DIAG INJ SC/IM: CPT | Performed by: INTERNAL MEDICINE

## 2024-02-28 PROCEDURE — 1123F ACP DISCUSS/DSCN MKR DOCD: CPT | Performed by: INTERNAL MEDICINE

## 2024-02-28 PROCEDURE — 99214 OFFICE O/P EST MOD 30 MIN: CPT | Performed by: INTERNAL MEDICINE

## 2024-02-28 PROCEDURE — 3077F SYST BP >= 140 MM HG: CPT | Performed by: INTERNAL MEDICINE

## 2024-02-28 RX ORDER — OLOPATADINE HYDROCHLORIDE 1 MG/ML
1 SOLUTION/ DROPS OPHTHALMIC 2 TIMES DAILY
Qty: 5 ML | Refills: 0 | Status: SHIPPED | OUTPATIENT
Start: 2024-02-28 | End: 2024-04-18

## 2024-02-28 RX ORDER — CARVEDILOL 6.25 MG/1
6.25 TABLET ORAL 2 TIMES DAILY
Qty: 60 TABLET | Refills: 1 | Status: SHIPPED | OUTPATIENT
Start: 2024-02-28

## 2024-02-28 RX ORDER — CETIRIZINE HYDROCHLORIDE 10 MG/1
10 TABLET ORAL DAILY
Qty: 90 TABLET | Refills: 0 | Status: SHIPPED | OUTPATIENT
Start: 2024-02-28

## 2024-02-28 RX ORDER — METHYLPREDNISOLONE SODIUM SUCCINATE 40 MG/ML
40 INJECTION, POWDER, LYOPHILIZED, FOR SOLUTION INTRAMUSCULAR; INTRAVENOUS ONCE
Status: DISCONTINUED | OUTPATIENT
Start: 2024-02-28 | End: 2024-02-29

## 2024-02-28 RX ORDER — METHYLPREDNISOLONE ACETATE 40 MG/ML
40 INJECTION, SUSPENSION INTRA-ARTICULAR; INTRALESIONAL; INTRAMUSCULAR; SOFT TISSUE ONCE
Status: COMPLETED | OUTPATIENT
Start: 2024-02-28 | End: 2024-02-28

## 2024-02-28 RX ADMIN — METHYLPREDNISOLONE ACETATE 40 MG: 40 INJECTION, SUSPENSION INTRA-ARTICULAR; INTRALESIONAL; INTRAMUSCULAR; SOFT TISSUE at 15:19

## 2024-02-28 NOTE — PROGRESS NOTES
Visit Information    Have you changed or started any medications since your last visit including any over-the-counter medicines, vitamins, or herbal medicines? no   Are you having any side effects from any of your medications? -  no  Have you stopped taking any of your medications? Is so, why? -  no    Have you seen any other physician or provider since your last visit? Yes - Records Obtained  Have you had any other diagnostic tests since your last visit? Yes - Records Obtained  Have you been seen in the emergency room and/or had an admission to a hospital since we last saw you? No  Have you had your routine dental cleaning in the past 6 months? no    Have you activated your [employer] account? If not, what are your barriers? Yes     Patient Care Team:  Abel Cuenca MD as PCP - General (Internal Medicine)  Abel Cuenca MD as PCP - Empaneled Provider    Medical History Review  Past Medical, Family, and Social History reviewed and does contribute to the patient presenting condition    Health Maintenance   Topic Date Due    HIV screen  Never done    Hepatitis C screen  Never done    Shingles vaccine (1 of 2) Never done    Respiratory Syncytial Virus (RSV) Pregnant or age 60 yrs+ (1 - 1-dose 60+ series) Never done    COVID-19 Vaccine (2 - 2023-24 season) 09/01/2023    Annual Wellness Visit (Medicare Advantage)  Never done    Pneumococcal 65+ years Vaccine (2 - PCV) 10/30/2024    Depression Screen  01/24/2025    Breast cancer screen  10/18/2025    Colorectal Cancer Screen  11/29/2026    Diabetes screen  01/13/2027    Cervical cancer screen  11/16/2028    Lipids  01/15/2029    DTaP/Tdap/Td vaccine (2 - Td or Tdap) 10/30/2033    DEXA (modify frequency per FRAX score)  Completed    Flu vaccine  Completed    Hepatitis A vaccine  Aged Out    Hepatitis B vaccine  Aged Out    Hib vaccine  Aged Out    Polio vaccine  Aged Out    Meningococcal (ACWY) vaccine  Aged Out    Pneumococcal 0-64 years Vaccine  Discontinued       
  Weight: 80.7 kg (178 lb)      BP Readings from Last 3 Encounters:   02/28/24 (!) 150/88   02/08/24 (!) 140/80   02/02/24 (!) 140/80        Constitutional: Normal appearance for chronological age, does not appear chronically ill.  Mild puffiness around the eyes, has mild erythema around the neck.  No tongue swelling, able to visualize posterior pharynx, no stridor  HEENT: The pupils are equal and reactive.   Neck: Trachea is midline. The neck is supple. Negative nodes.  Respiratory: normal respiratory effort. Lungs are clear to auscultation bilaterally. no wheezing, no crackles.   Cardiovascular: No jugular venous distention or carotid bruits. Normal S1 and S2 sounds are noted without murmurs, rubs, gallops.  No leg edema  Abdomen: Soft to palpation in all four quadrants. There is no organomegaly and no rebound tenderness. Bowel sounds are normal.   Musculoskeletal: Muscular strength good bilaterally  Neurological: Normal [place] Coma Scale.  Alert oriented x3, grossly nonfocal  No skin lesions otherwise    Lab Results   Component Value Date    LABA1C 5.3 01/13/2024     Lab Results   Component Value Date     01/13/2024      LABORATORY FINDINGS:    CBC:  Lab Results   Component Value Date/Time    WBC 5.5 02/28/2024 01:05 PM    HGB 12.5 02/28/2024 01:05 PM     02/28/2024 01:05 PM       BMP:    Lab Results   Component Value Date/Time     02/28/2024 01:05 PM    K 4.3 02/28/2024 01:05 PM     02/28/2024 01:05 PM    CO2 26 02/28/2024 01:05 PM    BUN 9 02/28/2024 01:05 PM    CREATININE 0.6 02/28/2024 01:05 PM    GLUCOSE 84 02/28/2024 01:05 PM       HEMOGLOBIN A1C:   Hemoglobin A1C (%)   Date Value   01/13/2024 5.3   10/18/2023 5.2        FASTING LIPID PANEL:No results found for: \"LIPIDPAN\"    TSH:No results found for: \"TSHREFFT4\"    No results found for: \"TSHREFFT4\"     No valid procedures specified.     ASSESSMENT AND PLAN:      Diagnoses and all orders for this visit:  Allergy, initial

## 2024-02-28 NOTE — TELEPHONE ENCOUNTER
Patient called looking for a same day appt because her itchiness and puffiness around her eyes, face and neck is still bothering her really bad. She went to the urgent care on the 22nd and they didn't do anything for her and she really would just like to get a steroid shot. Her symptoms have been going on since the 10th of January.    Please advise

## 2024-02-28 NOTE — TELEPHONE ENCOUNTER
Unable to make a diagnosis without evaluating patient, for now I would recommend getting blood work and please schedule an appointment with me today.

## 2024-03-14 ENCOUNTER — TELEPHONE (OUTPATIENT)
Dept: INTERNAL MEDICINE CLINIC | Age: 64
End: 2024-03-14

## 2024-03-14 DIAGNOSIS — T78.40XA ALLERGY, INITIAL ENCOUNTER: Primary | ICD-10-CM

## 2024-03-14 NOTE — TELEPHONE ENCOUNTER
Patient called and is requesting a refill on hydroxyzine pamoate 25mg. Not finding on medication list or history. Needs to go to [facility] on [address]. Please advise

## 2024-03-15 ENCOUNTER — OFFICE VISIT (OUTPATIENT)
Dept: NEUROLOGY | Age: 64
End: 2024-03-15
Payer: COMMERCIAL

## 2024-03-15 VITALS
DIASTOLIC BLOOD PRESSURE: 82 MMHG | BODY MASS INDEX: 32.02 KG/M2 | WEIGHT: 174 LBS | HEIGHT: 62 IN | SYSTOLIC BLOOD PRESSURE: 167 MMHG | HEART RATE: 60 BPM

## 2024-03-15 DIAGNOSIS — I67.1 INTRACRANIAL ANEURYSM: Primary | ICD-10-CM

## 2024-03-15 PROCEDURE — 3077F SYST BP >= 140 MM HG: CPT | Performed by: STUDENT IN AN ORGANIZED HEALTH CARE EDUCATION/TRAINING PROGRAM

## 2024-03-15 PROCEDURE — 99215 OFFICE O/P EST HI 40 MIN: CPT | Performed by: STUDENT IN AN ORGANIZED HEALTH CARE EDUCATION/TRAINING PROGRAM

## 2024-03-15 PROCEDURE — 1123F ACP DISCUSS/DSCN MKR DOCD: CPT | Performed by: STUDENT IN AN ORGANIZED HEALTH CARE EDUCATION/TRAINING PROGRAM

## 2024-03-15 PROCEDURE — 3079F DIAST BP 80-89 MM HG: CPT | Performed by: STUDENT IN AN ORGANIZED HEALTH CARE EDUCATION/TRAINING PROGRAM

## 2024-03-15 RX ORDER — HYDROXYZINE HYDROCHLORIDE 25 MG/1
25 TABLET, FILM COATED ORAL EVERY 8 HOURS PRN
Qty: 30 TABLET | Refills: 0 | Status: SHIPPED | OUTPATIENT
Start: 2024-03-15 | End: 2024-03-25

## 2024-03-15 NOTE — TELEPHONE ENCOUNTER
More concerns does the patient have, is she having an allergy reaction?    Medication prescribed.  Thanks

## 2024-03-15 NOTE — PROGRESS NOTES
Endovascular Neurosurgery Clinic Note    Pt Name: Miladys Baker  MRN: [id number]  YOB: 1960  Date of evaluation: 3/15/2024  Primary Care Physician: Abel Cuenca MD  Patient evaluated at the request of  Dr. 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 Will proceed with a follow-up DSA in 3 months from first embolization     History of Chief Complaint 1/12/2024:    Miladys Baker is a 63 y.o. female who presents with with sudden onset thunderclap headache with neck stiffness.  The patient denies any other focal neurological deficits.  CT head unrevealing for acute bleeding.  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     Of note, the patient has a very extensive significant family history of ruptured cerebral aneurysm (grandfather, father, paternal aunt who happened to be one of our patients as well)    The patient denies any history of vasculitis, kidney diseases, smoking.       Modified Luciano scale 0  Hunt and Ibarra 2    Review of Systems:  CONSTITUTIONAL:  negative for fevers, chills, fatigue and malaise    EYES:  negative for double vision, blurred vision and photophobia     HEENT:  negative for tinnitus, epistaxis and sore throat    RESPIRATORY:  negative for cough, shortness of breath, wheezing    CARDIOVASCULAR:  negative for chest pain, palpitations, syncope, edema    GASTROINTESTINAL:  negative for nausea, vomiting    GENITOURINARY:  negative for incontinence    MUSCULOSKELETAL:  negative for neck or back pain    NEUROLOGICAL:  Negative for weakness and tingling  negative for headaches and dizziness    PSYCHIATRIC:  negative for anxiety      Review of systems otherwise negative.      OBJECTIVE:   Vitals: BP (!) 167/82 (Site: Left Upper Arm, Position: Sitting, Cuff Size: Small Adult)   Pulse 60   Ht 1.575 m (5'

## 2024-03-15 NOTE — TELEPHONE ENCOUNTER
Patient states she ran out of the medication and was still having some itching on her face and neck.

## 2024-03-22 ENCOUNTER — OFFICE VISIT (OUTPATIENT)
Dept: INTERNAL MEDICINE CLINIC | Age: 64
End: 2024-03-22
Payer: COMMERCIAL

## 2024-03-22 VITALS
DIASTOLIC BLOOD PRESSURE: 88 MMHG | WEIGHT: 176 LBS | SYSTOLIC BLOOD PRESSURE: 150 MMHG | HEIGHT: 63 IN | BODY MASS INDEX: 31.18 KG/M2 | OXYGEN SATURATION: 98 % | HEART RATE: 78 BPM

## 2024-03-22 DIAGNOSIS — I10 PRIMARY HYPERTENSION: Primary | ICD-10-CM

## 2024-03-22 PROCEDURE — 1123F ACP DISCUSS/DSCN MKR DOCD: CPT | Performed by: INTERNAL MEDICINE

## 2024-03-22 PROCEDURE — 3077F SYST BP >= 140 MM HG: CPT | Performed by: INTERNAL MEDICINE

## 2024-03-22 PROCEDURE — 99214 OFFICE O/P EST MOD 30 MIN: CPT | Performed by: INTERNAL MEDICINE

## 2024-03-22 PROCEDURE — 3079F DIAST BP 80-89 MM HG: CPT | Performed by: INTERNAL MEDICINE

## 2024-03-22 RX ORDER — HYDROCHLOROTHIAZIDE 12.5 MG/1
12.5 CAPSULE, GELATIN COATED ORAL EVERY MORNING
Qty: 30 CAPSULE | Refills: 0 | Status: SHIPPED | OUTPATIENT
Start: 2024-03-22

## 2024-03-22 NOTE — PROGRESS NOTES
Visit Information    Have you changed or started any medications since your last visit including any over-the-counter medicines, vitamins, or herbal medicines? no   Are you having any side effects from any of your medications? -  no  Have you stopped taking any of your medications? Is so, why? -  no    Have you seen any other physician or provider since your last visit? Yes - Records Obtained  Have you had any other diagnostic tests since your last visit? Yes - Records Obtained  Have you been seen in the emergency room and/or had an admission to a hospital since we last saw you? No  Have you had your routine dental cleaning in the past 6 months? no    Have you activated your [employer] account? If not, what are your barriers? Yes     Patient Care Team:  Abel Cuenca MD as PCP - General (Internal Medicine)  Abel Cuenca MD as PCP - Empaneled Provider    Medical History Review  Past Medical, Family, and Social History reviewed and does not contribute to the patient presenting condition    Health Maintenance   Topic Date Due    HIV screen  Never done    Hepatitis C screen  Never done    Shingles vaccine (1 of 2) Never done    Respiratory Syncytial Virus (RSV) Pregnant or age 60 yrs+ (1 - 1-dose 60+ series) Never done    COVID-19 Vaccine (2 - 2023-24 season) 09/01/2023    Annual Wellness Visit (Medicare Advantage)  Never done    Pneumococcal 65+ years Vaccine (2 of 2 - PCV) 10/30/2024    Depression Screen  01/24/2025    Breast cancer screen  10/18/2025    Colorectal Cancer Screen  11/29/2026    Cervical cancer screen  11/16/2028    Lipids  01/15/2029    DTaP/Tdap/Td vaccine (2 - Td or Tdap) 10/30/2033    DEXA (modify frequency per FRAX score)  Completed    Flu vaccine  Completed    Hepatitis A vaccine  Aged Out    Hepatitis B vaccine  Aged Out    Hib vaccine  Aged Out    Polio vaccine  Aged Out    Meningococcal (ACWY) vaccine  Aged Out    Pneumococcal 0-64 years Vaccine  Discontinued    Diabetes screen  
morning 30 capsule 0    hydrOXYzine HCl (ATARAX) 25 MG tablet Take 1 tablet by mouth every 8 hours as needed for Itching 30 tablet 0    cetirizine (ZYRTEC) 10 MG tablet Take 1 tablet by mouth daily 90 tablet 0    carvedilol (COREG) 6.25 MG tablet Take 1 tablet by mouth 2 times daily 60 tablet 1    olopatadine (PATADAY) 0.1 % ophthalmic solution Place 1 drop into both eyes 2 times daily 5 mL 0    ibuprofen (ADVIL;MOTRIN) 600 MG tablet Take 1 tablet by mouth every 6 hours as needed for Pain 120 tablet 0    aspirin 81 MG chewable tablet Take 1 tablet by mouth daily 30 tablet 3    lisinopril (PRINIVIL;ZESTRIL) 40 MG tablet Take 1 tablet by mouth daily 30 tablet 3    albuterol sulfate HFA (PROVENTIL;VENTOLIN;PROAIR) 108 (90 Base) MCG/ACT inhaler Inhale 2 puffs into the lungs every 6 hours as needed for Wheezing 18 g 3     No current facility-administered medications for this visit.       SOCIAL HISTORY    Reviewed and no change from previous record. Miladys  reports that she has never smoked. She has never used smokeless tobacco.    FAMILY HISTORY:    Reviewed and No change from previous visit  family history includes Cancer in her maternal grandfather, maternal grandmother, paternal grandfather, and paternal grandmother; Diabetes in her brother, father, mother, and sister; Heart Attack in her paternal grandmother; Heart Failure in her father and mother; Hypertension in her paternal grandfather and paternal grandmother.      PHYSICAL EXAM:      Vitals:    03/22/24 1107   BP: (!) 150/88   Site: Right Upper Arm   Pulse: 78   SpO2: 98%   Weight: 79.8 kg (176 lb)   Height: 1.6 m (5' 3\")     BP Readings from Last 3 Encounters:   03/22/24 (!) 150/88   03/15/24 (!) 167/82   02/28/24 (!) 150/88        Constitutional: Normal appearance for chronological age, does not appear chronically ill.  Mild puffiness around the eyes, has mild erythema around the neck.  No tongue swelling, able to visualize posterior pharynx, no

## 2024-04-01 ENCOUNTER — TELEPHONE (OUTPATIENT)
Dept: INTERNAL MEDICINE CLINIC | Age: 64
End: 2024-04-01

## 2024-04-01 DIAGNOSIS — T78.40XA ALLERGY, INITIAL ENCOUNTER: Primary | ICD-10-CM

## 2024-04-01 NOTE — TELEPHONE ENCOUNTER
Patient has found an Allergy Specialist that her insurance covers. Her previous referral was not covered.    She would like a referral to Dr. Ja Pitts  Phone [phone] Fax [phone] [address].    Please advise

## 2024-04-04 ENCOUNTER — TELEPHONE (OUTPATIENT)
Dept: INTERNAL MEDICINE CLINIC | Age: 64
End: 2024-04-04

## 2024-04-04 NOTE — TELEPHONE ENCOUNTER
----- Message from Umm Saeed sent at 4/4/2024  4:05 PM EDT -----  Subject: Message to Provider    QUESTIONS  Information for Provider? Clarita called from [facility] Cardiology   department stating PCP Abel Cuenca has sent a referral for the patient   in regard to Allergy's however it is going to Cardiology. The patient   needs the phone number to the Allergist she is being referred to. Clarita   states the referral is going to the wrong department if someone could look   into this as the patient keeps calling them.  ---------------------------------------------------------------------------  --------------  CALL BACK INFO  [phone]; OK to leave message on voicemail  ---------------------------------------------------------------------------  --------------  SCRIPT ANSWERS  Relationship to Patient? Covered Entity  Covered Entity Type? Physician Office?  Representative Name? Clarita

## 2024-04-08 ENCOUNTER — TELEPHONE (OUTPATIENT)
Dept: INTERNAL MEDICINE CLINIC | Age: 64
End: 2024-04-08

## 2024-04-08 DIAGNOSIS — T78.40XA ALLERGY, INITIAL ENCOUNTER: Primary | ICD-10-CM

## 2024-04-08 NOTE — TELEPHONE ENCOUNTER
Dr. Ja Pitts  Phone [phone] Fax [phone] [address].       Patient called stating that the referral that has been placed does not accept her insurance.        Patient found a provider that is covered Dr Jf Silvestre / [place]     Fax [phone]

## 2024-04-09 ENCOUNTER — OFFICE VISIT (OUTPATIENT)
Dept: INTERNAL MEDICINE CLINIC | Age: 64
End: 2024-04-09
Payer: COMMERCIAL

## 2024-04-09 VITALS
BODY MASS INDEX: 30.79 KG/M2 | HEART RATE: 83 BPM | SYSTOLIC BLOOD PRESSURE: 134 MMHG | WEIGHT: 173.8 LBS | DIASTOLIC BLOOD PRESSURE: 88 MMHG | HEIGHT: 63 IN | OXYGEN SATURATION: 95 %

## 2024-04-09 DIAGNOSIS — I10 PRIMARY HYPERTENSION: ICD-10-CM

## 2024-04-09 DIAGNOSIS — R21 FACIAL RASH: ICD-10-CM

## 2024-04-09 DIAGNOSIS — L23.9 ALLERGIC DERMATITIS: Primary | ICD-10-CM

## 2024-04-09 PROCEDURE — 3079F DIAST BP 80-89 MM HG: CPT | Performed by: INTERNAL MEDICINE

## 2024-04-09 PROCEDURE — 3075F SYST BP GE 130 - 139MM HG: CPT | Performed by: INTERNAL MEDICINE

## 2024-04-09 PROCEDURE — 99214 OFFICE O/P EST MOD 30 MIN: CPT | Performed by: INTERNAL MEDICINE

## 2024-04-09 PROCEDURE — 1123F ACP DISCUSS/DSCN MKR DOCD: CPT | Performed by: INTERNAL MEDICINE

## 2024-04-09 RX ORDER — BENZOCAINE/MENTHOL 6 MG-10 MG
LOZENGE MUCOUS MEMBRANE
Qty: 30 G | Refills: 1 | Status: SHIPPED | OUTPATIENT
Start: 2024-04-09 | End: 2024-04-16

## 2024-04-09 RX ORDER — AMLODIPINE BESYLATE 5 MG/1
5 TABLET ORAL DAILY
Qty: 30 TABLET | Refills: 3 | Status: SHIPPED | OUTPATIENT
Start: 2024-04-09

## 2024-04-09 RX ORDER — BETAMETHASONE DIPROPIONATE 0.5 MG/G
LOTION TOPICAL
Qty: 1 EACH | Refills: 1 | Status: SHIPPED | OUTPATIENT
Start: 2024-04-09

## 2024-04-09 ASSESSMENT — PATIENT HEALTH QUESTIONNAIRE - PHQ9
SUM OF ALL RESPONSES TO PHQ9 QUESTIONS 1 & 2: 0
SUM OF ALL RESPONSES TO PHQ QUESTIONS 1-9: 0
2. FEELING DOWN, DEPRESSED OR HOPELESS: NOT AT ALL
SUM OF ALL RESPONSES TO PHQ QUESTIONS 1-9: 0
1. LITTLE INTEREST OR PLEASURE IN DOING THINGS: NOT AT ALL
SUM OF ALL RESPONSES TO PHQ QUESTIONS 1-9: 0
SUM OF ALL RESPONSES TO PHQ QUESTIONS 1-9: 0

## 2024-04-09 NOTE — PROGRESS NOTES
Subjective:      Patient ID: Miladys Baker is a 63 y.o. female.    HPI      HPI   1) Location/Symptom -has itching around neck area/around eye   2) Timing/Onset: Since Jan, Moved [place]   3) Context/Setting: no SOB, Sneezing , Cough   4) Quality: Itching all lot   5) Duration: continuous   6) Modifying Factors: none   7) Severity: moderate   Working to get appointment with allergy specialist - 4/19   Take Ibuprofen as needed  Taking Lisinopril for long time   No new medication   Review of Systems   Constitutional:  Negative for activity change, appetite change, chills, diaphoresis, fatigue and fever.   HENT:  Negative for congestion, dental problem, drooling and ear discharge.    Eyes:  Negative for pain, discharge, redness and itching.   Respiratory:  Negative for apnea, cough, choking, chest tightness and shortness of breath.    Cardiovascular:  Negative for chest pain and leg swelling.   Gastrointestinal:  Negative for abdominal distention, abdominal pain, blood in stool, constipation and diarrhea.   Endocrine: Negative for cold intolerance and heat intolerance.   Genitourinary:  Negative for difficulty urinating, dysuria, enuresis, flank pain and frequency.   Musculoskeletal:  Negative for arthralgias, back pain, gait problem and joint swelling.   Skin:  Positive for rash (On face, neck area, with itching at the local site.). Negative for color change and pallor.   Neurological:  Negative for dizziness, facial asymmetry, light-headedness, numbness and headaches.   Psychiatric/Behavioral:  Negative for agitation, behavioral problems, confusion, decreased concentration and dysphoric mood.        Objective:   Physical Exam  Constitutional:       Appearance: She is well-developed. She is not diaphoretic.   HENT:      Head: Normocephalic and atraumatic.      Mouth/Throat:      Pharynx: No oropharyngeal exudate.   Eyes:      General: No scleral icterus.        Right eye: No discharge.         Left eye: No

## 2024-04-11 RX ORDER — CARVEDILOL 6.25 MG/1
6.25 TABLET ORAL 2 TIMES DAILY
Qty: 60 TABLET | Refills: 1 | Status: SHIPPED | OUTPATIENT
Start: 2024-04-11

## 2024-04-11 ASSESSMENT — ENCOUNTER SYMPTOMS
COLOR CHANGE: 0
SHORTNESS OF BREATH: 0
CONSTIPATION: 0
COUGH: 0
EYE REDNESS: 0
EYE PAIN: 0
ABDOMINAL DISTENTION: 0
CHOKING: 0
APNEA: 0
BLOOD IN STOOL: 0
EYE ITCHING: 0
ABDOMINAL PAIN: 0
BACK PAIN: 0
CHEST TIGHTNESS: 0
EYE DISCHARGE: 0

## 2024-04-17 RX ORDER — HYDROCHLOROTHIAZIDE 12.5 MG/1
12.5 CAPSULE, GELATIN COATED ORAL EVERY MORNING
Qty: 30 CAPSULE | Refills: 3 | Status: SHIPPED | OUTPATIENT
Start: 2024-04-17

## 2024-04-17 NOTE — TELEPHONE ENCOUNTER
[facility] faxed refill request for Hydrochlorothiazide   Last office visit 4/9/2024

## 2024-04-30 ENCOUNTER — OFFICE VISIT (OUTPATIENT)
Dept: NEUROLOGY | Age: 64
End: 2024-04-30
Payer: COMMERCIAL

## 2024-04-30 VITALS
BODY MASS INDEX: 30.3 KG/M2 | DIASTOLIC BLOOD PRESSURE: 82 MMHG | WEIGHT: 171 LBS | SYSTOLIC BLOOD PRESSURE: 147 MMHG | HEIGHT: 63 IN

## 2024-04-30 DIAGNOSIS — I67.1 INTRACRANIAL ANEURYSM: Primary | ICD-10-CM

## 2024-04-30 PROCEDURE — 99215 OFFICE O/P EST HI 40 MIN: CPT | Performed by: PSYCHIATRY & NEUROLOGY

## 2024-04-30 PROCEDURE — 1123F ACP DISCUSS/DSCN MKR DOCD: CPT | Performed by: PSYCHIATRY & NEUROLOGY

## 2024-04-30 PROCEDURE — 3079F DIAST BP 80-89 MM HG: CPT | Performed by: PSYCHIATRY & NEUROLOGY

## 2024-04-30 PROCEDURE — 3077F SYST BP >= 140 MM HG: CPT | Performed by: PSYCHIATRY & NEUROLOGY

## 2024-04-30 RX ORDER — AZELASTINE 1 MG/ML
SPRAY, METERED NASAL
COMMUNITY
Start: 2024-04-19

## 2024-04-30 RX ORDER — OLOPATADINE HYDROCHLORIDE 1 MG/ML
1 SOLUTION/ DROPS OPHTHALMIC 2 TIMES DAILY
COMMUNITY

## 2024-04-30 NOTE — PROGRESS NOTES
Endovascular Neurosurgery Clinic Note    Pt Name: Miladys Baker  MRN: [id number]  YOB: 1960  Date of evaluation: 4/30/2024  Primary Care Physician: Abel Cuenca MD  Patient evaluated at the request of  Dr. 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 female who presents with with sudden onset thunderclap headache with neck stiffness.  The patient denies any other focal neurological deficits.  CT head unrevealing for acute bleeding.  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     Of note, the patient has a very extensive significant family history of ruptured cerebral aneurysm (grandfather, father, paternal aunt who happened to be one of our patients as well)    The patient denies any history of vasculitis, kidney diseases, smoking.     Doing well, no complaint. Will proceed with a follow-up DSA in 3 months from first embolization     Modified Luciano scale 0  Hunt and Ibarra 2    Review of Systems:  CONSTITUTIONAL:  negative for fevers, chills, fatigue and malaise    EYES:  negative for double vision, blurred vision and photophobia     HEENT:  negative for tinnitus, epistaxis and sore throat    RESPIRATORY:  negative for cough, shortness of breath, wheezing    CARDIOVASCULAR:  negative for chest pain, palpitations, syncope, edema    GASTROINTESTINAL:  negative for nausea, vomiting    GENITOURINARY:  negative for incontinence    MUSCULOSKELETAL:  negative for neck or back pain    NEUROLOGICAL:  Negative for weakness and tingling  negative for headaches and dizziness    PSYCHIATRIC:  negative for anxiety      Review of systems otherwise negative.      OBJECTIVE:   Vitals: BP (!) 147/82 (Site: Left Upper Arm, Position: Sitting, Cuff Size: Large Adult)   Ht 1.6 m (5' 3\")   Wt 77.6 kg (171 lb)   BMI 30.29 kg/m²     Neuro

## 2024-05-02 ENCOUNTER — HOSPITAL ENCOUNTER (OUTPATIENT)
Dept: INTERVENTIONAL RADIOLOGY/VASCULAR | Age: 64
Discharge: HOME | End: 2024-05-04
Payer: COMMERCIAL

## 2024-05-02 VITALS
BODY MASS INDEX: 30.48 KG/M2 | HEIGHT: 63 IN | TEMPERATURE: 97.7 F | HEART RATE: 67 BPM | SYSTOLIC BLOOD PRESSURE: 147 MMHG | RESPIRATION RATE: 16 BRPM | WEIGHT: 172 LBS | OXYGEN SATURATION: 97 % | DIASTOLIC BLOOD PRESSURE: 72 MMHG

## 2024-05-02 DIAGNOSIS — I67.1 INTRACRANIAL ANEURYSM: ICD-10-CM

## 2024-05-02 LAB
BUN BLD-MCNC: 18 MG/DL (ref 8–26)
CHLORIDE BLD-SCNC: 104 MMOL/L (ref 98–107)
EGFR, POC: >90 ML/MIN/1.73M2
GLUCOSE BLD-MCNC: 113 MG/DL (ref 74–100)
HCT VFR BLD AUTO: 43 % (ref 36–46)
POC CREATININE: 0.5 MG/DL (ref 0.51–1.19)
POC HEMOGLOBIN (CALC): 14.6 G/DL (ref 12–16)
POTASSIUM BLD-SCNC: 3.4 MMOL/L (ref 3.5–4.5)
SODIUM BLD-SCNC: 146 MMOL/L (ref 138–146)

## 2024-05-02 PROCEDURE — 6360000004 HC RX CONTRAST MEDICATION: Performed by: PSYCHIATRY & NEUROLOGY

## 2024-05-02 PROCEDURE — 6360000002 HC RX W HCPCS: Performed by: PSYCHIATRY & NEUROLOGY

## 2024-05-02 PROCEDURE — 36224 PLACE CATH CAROTD ART: CPT

## 2024-05-02 PROCEDURE — 84520 ASSAY OF UREA NITROGEN: CPT

## 2024-05-02 PROCEDURE — 99153 MOD SED SAME PHYS/QHP EA: CPT

## 2024-05-02 PROCEDURE — 99152 MOD SED SAME PHYS/QHP 5/>YRS: CPT

## 2024-05-02 PROCEDURE — 99214 OFFICE O/P EST MOD 30 MIN: CPT | Performed by: PSYCHIATRY & NEUROLOGY

## 2024-05-02 PROCEDURE — 82565 ASSAY OF CREATININE: CPT

## 2024-05-02 PROCEDURE — 2580000003 HC RX 258: Performed by: PSYCHIATRY & NEUROLOGY

## 2024-05-02 PROCEDURE — 84132 ASSAY OF SERUM POTASSIUM: CPT

## 2024-05-02 PROCEDURE — 82947 ASSAY GLUCOSE BLOOD QUANT: CPT

## 2024-05-02 PROCEDURE — 76377 3D RENDER W/INTRP POSTPROCES: CPT

## 2024-05-02 PROCEDURE — 82435 ASSAY OF BLOOD CHLORIDE: CPT

## 2024-05-02 PROCEDURE — C1769 GUIDE WIRE: HCPCS

## 2024-05-02 PROCEDURE — 85014 HEMATOCRIT: CPT

## 2024-05-02 PROCEDURE — 84295 ASSAY OF SERUM SODIUM: CPT

## 2024-05-02 RX ORDER — HEPARIN SODIUM 1000 [USP'U]/ML
INJECTION, SOLUTION INTRAVENOUS; SUBCUTANEOUS PRN
Status: COMPLETED | OUTPATIENT
Start: 2024-05-02 | End: 2024-05-02

## 2024-05-02 RX ORDER — SODIUM CHLORIDE 9 MG/ML
INJECTION, SOLUTION INTRAVENOUS CONTINUOUS
Status: DISCONTINUED | OUTPATIENT
Start: 2024-05-02 | End: 2024-05-05 | Stop reason: HOSPADM

## 2024-05-02 RX ORDER — ONDANSETRON 2 MG/ML
4 INJECTION INTRAMUSCULAR; INTRAVENOUS EVERY 6 HOURS PRN
Status: DISCONTINUED | OUTPATIENT
Start: 2024-05-02 | End: 2024-05-05 | Stop reason: HOSPADM

## 2024-05-02 RX ORDER — SODIUM CHLORIDE 9 MG/ML
INJECTION, SOLUTION INTRAVENOUS PRN
Status: DISCONTINUED | OUTPATIENT
Start: 2024-05-02 | End: 2024-05-05 | Stop reason: HOSPADM

## 2024-05-02 RX ORDER — MIDAZOLAM HYDROCHLORIDE 2 MG/2ML
INJECTION, SOLUTION INTRAMUSCULAR; INTRAVENOUS PRN
Status: COMPLETED | OUTPATIENT
Start: 2024-05-02 | End: 2024-05-02

## 2024-05-02 RX ORDER — IODIXANOL 270 MG/ML
100 INJECTION, SOLUTION INTRAVASCULAR
Status: COMPLETED | OUTPATIENT
Start: 2024-05-02 | End: 2024-05-02

## 2024-05-02 RX ORDER — POTASSIUM CHLORIDE 20 MEQ/1
20 TABLET, EXTENDED RELEASE ORAL ONCE
Status: DISCONTINUED | OUTPATIENT
Start: 2024-05-02 | End: 2024-05-05 | Stop reason: HOSPADM

## 2024-05-02 RX ORDER — FENTANYL CITRATE 50 UG/ML
INJECTION, SOLUTION INTRAMUSCULAR; INTRAVENOUS PRN
Status: COMPLETED | OUTPATIENT
Start: 2024-05-02 | End: 2024-05-02

## 2024-05-02 RX ORDER — ONDANSETRON 4 MG/1
4 TABLET, ORALLY DISINTEGRATING ORAL EVERY 8 HOURS PRN
Status: DISCONTINUED | OUTPATIENT
Start: 2024-05-02 | End: 2024-05-05 | Stop reason: HOSPADM

## 2024-05-02 RX ORDER — SODIUM CHLORIDE 0.9 % (FLUSH) 0.9 %
5-40 SYRINGE (ML) INJECTION EVERY 12 HOURS SCHEDULED
Status: DISCONTINUED | OUTPATIENT
Start: 2024-05-02 | End: 2024-05-05 | Stop reason: HOSPADM

## 2024-05-02 RX ORDER — ACETAMINOPHEN 325 MG/1
650 TABLET ORAL EVERY 4 HOURS PRN
Status: DISCONTINUED | OUTPATIENT
Start: 2024-05-02 | End: 2024-05-05 | Stop reason: HOSPADM

## 2024-05-02 RX ORDER — SODIUM CHLORIDE 0.9 % (FLUSH) 0.9 %
5-40 SYRINGE (ML) INJECTION PRN
Status: DISCONTINUED | OUTPATIENT
Start: 2024-05-02 | End: 2024-05-05 | Stop reason: HOSPADM

## 2024-05-02 RX ADMIN — MIDAZOLAM HYDROCHLORIDE 1 MG: 1 INJECTION, SOLUTION INTRAMUSCULAR; INTRAVENOUS at 11:04

## 2024-05-02 RX ADMIN — FENTANYL CITRATE 100 MCG: 50 INJECTION, SOLUTION INTRAMUSCULAR; INTRAVENOUS at 11:04

## 2024-05-02 RX ADMIN — IODIXANOL 34 ML: 270 INJECTION, SOLUTION INTRAVASCULAR at 11:42

## 2024-05-02 RX ADMIN — SODIUM CHLORIDE: 9 INJECTION, SOLUTION INTRAVENOUS at 08:10

## 2024-05-02 RX ADMIN — HEPARIN SODIUM 2000 UNITS: 1000 INJECTION, SOLUTION INTRAVENOUS; SUBCUTANEOUS at 11:06

## 2024-05-02 NOTE — DISCHARGE INSTRUCTIONS
slight changes in your behavior and/or your reaction time because of the medication used during the procedure. Therefore you should follow these instructions.  \" Have someone responsible help you with your care.  \" Do not drive for 24 hours.  \" Do not operate equipment for 24 hours (lawnmowers, power tools, kitchen accessories, stove).  \" Do not drink any alcoholic beverages for a minimum of 24 hours.  \" Do not make important personal, legal or business decisions for 24 hours.  \" You may experience dizziness or lightheadedness. Move slowly and carefully, do not make sudden position changes.  \" Drink extra amounts of fluids today.  \" Increase your diet as tolerated (unless you have received specific instructions from your doctor).  \" If you feel nauseated, continue with liquids until the nausea is gone.  \" Notify your physician if you have not urinated within 8 hours after the procedure.  \" Resume your medications unless otherwise instructed        Brain Angiogram  What is a brain angiogram?  A brain angiogram (cerebral angiogram) is a test (also called a procedure) that looks for problems with blood vessels and blow flow in the brain. These problems may include a bulge in a blood vessel (aneurysm), a narrowing or blockage of a blood vessel, or bleeding in the brain. The test may be used to check other symptoms, such as unusual headaches, or to check problems found during a different test.  The doctor puts a thin, flexible tube (catheter) into a blood vessel in your groin. Or the doctor may put the catheter in a blood vessel in your arm.  During the procedure, the doctor moves the catheter through the blood vessel into your brain. Then he or she injects a dye into the catheter. The dye flows into the blood vessel. The dye makes the blood vessels show up on a video screen. A picture of the blood vessel in the brain can be seen on a video screen. The doctor can look at the screen to see any problems with the blood

## 2024-05-02 NOTE — PROGRESS NOTES
Received post procedure to [facility] to room 2. Assessment obtained. Restrictions reviewed with patient. Post procedure pathway initiated.  Right groin  site soft, safeguard dry and intact.  No hematoma noted.  Family at side.  Patient without complaints. Head of bed flat with rt leg straight. No neuro defecits noted.

## 2024-05-02 NOTE — PROGRESS NOTES
All discharge instructions reviewed with friend and patient, questions answered.  Patient discharged per w/c with friend and belongings.

## 2024-05-02 NOTE — BRIEF OP NOTE
[facility]    NEUROENDOVASCULAR SERVICE: POST-OP NOTE: 5/2/2024    Pt Name: Miladys Baker  MRN: [id number]  YOB: 1960  Date of Procedure: 5/2/2024  Primary Care Physician: Abel Cuenca MD  Referring Physician:Abel Cuenca MD      Pre-Procedural Diagnosis:s/p R MCA bifurcation aneurysm WEB device embolization  Post-Procedural Diagnosis: as above      Procedure Performed:Diagnostic Cerebral Angiogram    Surgeon:   Kaley Garcia MD    Fellow:  Addison Fritz MD     Assisting Tech:  Mildred Garcia    PRE-PROCEDURAL EXAM:  Prestroke baseline mRS MODIFIED MEHRAN SCORE: 0 - No symptoms at all.  Neurological exam performed and unchanged from initial H&P or consult  MODIFIED MEHRAN SCORE: 0 - No symptoms at all.      Anesthesia: IV Moderate Sedation  An Immediate re-assessment was completed prior to sedation, and it is determined to be safe to proceed.  Complications: none    Intra-Operative EXAM:  Neurological exam performed and unchanged from initial H&P or consult    EBL: < Minimal      Cc            Specimens: Were not Obtained  Contrast:     Visipaque 270 low osmolar 34 Cc             Fluoro: 3.2 min    Findings:  Please see dictated Radiology note for further details  Previously treated right MCA bifurcation aneurysm is largely obliterated, there is a small dog ear shaped residual at the neck WEB OS C and Abel score II.        Abel score: class II    POST-PROCEDURAL EXAM :   Stable neurological Exam  Neurological exam performed and unchanged from initial H&P or consult    Closure:  manual pressure for 20 mins, 5Fr Vascade was attempted but the device was defective and not used        POST-PROCEDURAL MONITORING : see orders  Disposition: Recovery room      Recommendations:  Back to Recovery room  Do not bend right leg for 3 hours.  Groin checks per protocol.  Peripheral pulse checks per protocol.  SBP goal 100-140  Discontinue aspirin  Repeat DSA in 3

## 2024-05-02 NOTE — PROGRESS NOTES
Safeguard removed . No s/s of hematoma. No neuro deficits noted. Ambulated in vann ,voided. Joshua well.

## 2024-05-02 NOTE — PROGRESS NOTES
Patient admitted, consent signed and questions answered. Patient ready for procedure.   Friend at bedside with patient. Dr Fritz informed of potassium 3.4 per Linda in IR . Order obtained for 20meq potassium. Patient ready for procedure.

## 2024-05-02 NOTE — H&P
Abel Cuenca MD   aspirin 81 MG chewable tablet Take 1 tablet by mouth daily 24   Estella Green MD   albuterol sulfate HFA (PROVENTIL;VENTOLIN;PROAIR) 108 (90 Base) MCG/ACT inhaler Inhale 2 puffs into the lungs every 6 hours as needed for Wheezing 10/30/23   Abel Cuenca MD    Scheduled Meds:  Continuous Infusions:  PRN Meds:.  Past Medical History   has a past medical history of Allergies, Asthma, Endometriosis, Hypertension, Skin cancer, and Stroke (HCC).  Past Surgical History   has a past surgical history that includes  section (1980); Endometrial ablation; Skin cancer excision; Breast cyst excision (Right); Colonoscopy (N/A, 2023); and Cerebral angiogram (Bilateral, 2024).  Social History   reports that she has never smoked. She has never used smokeless tobacco.   reports no history of alcohol use.   reports no history of drug use.  Family History  family history includes Cancer in her maternal grandfather, maternal grandmother, paternal grandfather, and paternal grandmother; Diabetes in her brother, father, mother, and sister; Heart Attack in her paternal grandmother; Heart Failure in her father and mother; Hypertension in her paternal grandfather and paternal grandmother.    Review of Systems:  CONSTITUTIONAL:  negative for fevers, chills, fatigue and malaise    EYES:  negative for double vision, blurred vision and photophobia     HEENT:  negative for tinnitus, epistaxis and sore throat    RESPIRATORY:  negative for cough, shortness of breath, wheezing    CARDIOVASCULAR:  negative for chest pain, palpitations, syncope, edema    GASTROINTESTINAL:  negative for nausea, vomiting    GENITOURINARY:  negative for incontinence    MUSCULOSKELETAL:  negative for neck or back pain    NEUROLOGICAL:  Negative for weakness and tingling  negative for headaches and dizziness    PSYCHIATRIC:  negative for anxiety      Review of systems otherwise negative.      OBJECTIVE:   There were

## 2024-05-07 ENCOUNTER — OFFICE VISIT (OUTPATIENT)
Dept: INTERNAL MEDICINE CLINIC | Age: 64
End: 2024-05-07
Payer: COMMERCIAL

## 2024-05-07 VITALS
BODY MASS INDEX: 31.01 KG/M2 | SYSTOLIC BLOOD PRESSURE: 132 MMHG | WEIGHT: 175 LBS | OXYGEN SATURATION: 99 % | DIASTOLIC BLOOD PRESSURE: 84 MMHG | HEIGHT: 63 IN | HEART RATE: 69 BPM

## 2024-05-07 DIAGNOSIS — E87.6 HYPOKALEMIA: ICD-10-CM

## 2024-05-07 DIAGNOSIS — I67.1 INTRACRANIAL ANEURYSM: ICD-10-CM

## 2024-05-07 DIAGNOSIS — I10 PRIMARY HYPERTENSION: Primary | ICD-10-CM

## 2024-05-07 DIAGNOSIS — J45.20 MILD INTERMITTENT ASTHMA WITHOUT COMPLICATION: ICD-10-CM

## 2024-05-07 PROCEDURE — 3075F SYST BP GE 130 - 139MM HG: CPT | Performed by: INTERNAL MEDICINE

## 2024-05-07 PROCEDURE — 99214 OFFICE O/P EST MOD 30 MIN: CPT | Performed by: INTERNAL MEDICINE

## 2024-05-07 PROCEDURE — 3079F DIAST BP 80-89 MM HG: CPT | Performed by: INTERNAL MEDICINE

## 2024-05-07 PROCEDURE — 1123F ACP DISCUSS/DSCN MKR DOCD: CPT | Performed by: INTERNAL MEDICINE

## 2024-05-07 RX ORDER — HYDROCHLOROTHIAZIDE 25 MG/1
25 TABLET ORAL EVERY MORNING
Qty: 30 TABLET | Refills: 5 | Status: SHIPPED | OUTPATIENT
Start: 2024-05-07

## 2024-05-07 RX ORDER — POTASSIUM CHLORIDE 20 MEQ/1
20 TABLET, EXTENDED RELEASE ORAL DAILY
Qty: 90 TABLET | Refills: 1 | Status: SHIPPED | OUTPATIENT
Start: 2024-05-07

## 2024-05-07 SDOH — ECONOMIC STABILITY: FOOD INSECURITY: WITHIN THE PAST 12 MONTHS, THE FOOD YOU BOUGHT JUST DIDN'T LAST AND YOU DIDN'T HAVE MONEY TO GET MORE.: PATIENT DECLINED

## 2024-05-07 SDOH — ECONOMIC STABILITY: INCOME INSECURITY: HOW HARD IS IT FOR YOU TO PAY FOR THE VERY BASICS LIKE FOOD, HOUSING, MEDICAL CARE, AND HEATING?: PATIENT DECLINED

## 2024-05-07 SDOH — ECONOMIC STABILITY: FOOD INSECURITY: WITHIN THE PAST 12 MONTHS, YOU WORRIED THAT YOUR FOOD WOULD RUN OUT BEFORE YOU GOT MONEY TO BUY MORE.: PATIENT DECLINED

## 2024-05-07 SDOH — ECONOMIC STABILITY: HOUSING INSECURITY
IN THE LAST 12 MONTHS, WAS THERE A TIME WHEN YOU DID NOT HAVE A STEADY PLACE TO SLEEP OR SLEPT IN A SHELTER (INCLUDING NOW)?: PATIENT DECLINED

## 2024-05-07 ASSESSMENT — PATIENT HEALTH QUESTIONNAIRE - PHQ9
SUM OF ALL RESPONSES TO PHQ9 QUESTIONS 1 & 2: 0
1. LITTLE INTEREST OR PLEASURE IN DOING THINGS: NOT AT ALL
SUM OF ALL RESPONSES TO PHQ QUESTIONS 1-9: 0
2. FEELING DOWN, DEPRESSED OR HOPELESS: NOT AT ALL
SUM OF ALL RESPONSES TO PHQ QUESTIONS 1-9: 0

## 2024-05-07 ASSESSMENT — ENCOUNTER SYMPTOMS
COLOR CHANGE: 0
ABDOMINAL PAIN: 0
CHEST TIGHTNESS: 0
COUGH: 0
EYE PAIN: 0
EYE DISCHARGE: 0
CONSTIPATION: 0
EYE REDNESS: 0
EYE ITCHING: 0
CHOKING: 0
ABDOMINAL DISTENTION: 0
DIARRHEA: 0
SHORTNESS OF BREATH: 0
APNEA: 0
BACK PAIN: 0
BLOOD IN STOOL: 0

## 2024-05-07 NOTE — PROGRESS NOTES
Subjective     Patient ID: Miladys Baker is a 63 y.o. female.    HPI  Patient is here for evaluation of multimedical problem.  She has asthma, hypertension, history of cerebral aneurysm s/p coiling, follows with neurointerventionist  Patient, checks her blood pressure twice a day, she mentioned that most of her readings are high, stays more than 130  She does regular exercise  She is very compliant with low-salt diet  She had recent lab work, and found to have potassium of 3.4, patient is on Coreg, hydrochlorothiazide, Norvasc  Review of Systems   Constitutional:  Negative for activity change, appetite change, chills, diaphoresis, fatigue and fever.   HENT:  Negative for congestion, dental problem, drooling and ear discharge.    Eyes:  Negative for pain, discharge, redness and itching.   Respiratory:  Negative for apnea, cough, choking, chest tightness and shortness of breath.    Cardiovascular:  Negative for chest pain and leg swelling.   Gastrointestinal:  Negative for abdominal distention, abdominal pain, blood in stool, constipation and diarrhea.   Endocrine: Negative for cold intolerance and heat intolerance.   Genitourinary:  Negative for difficulty urinating, dysuria, enuresis, flank pain and frequency.   Musculoskeletal:  Negative for arthralgias, back pain, gait problem and joint swelling.   Skin:  Negative for color change, pallor and rash.   Neurological:  Negative for dizziness, facial asymmetry, light-headedness, numbness and headaches.   Psychiatric/Behavioral:  Negative for agitation, behavioral problems, confusion, decreased concentration and dysphoric mood.           Objective   Physical Exam  Constitutional:       Appearance: She is well-developed. She is obese. She is not diaphoretic.   HENT:      Head: Normocephalic and atraumatic.      Mouth/Throat:      Pharynx: No oropharyngeal exudate.   Eyes:      General: No scleral icterus.        Right eye: No discharge.         Left eye: No

## 2024-07-17 DIAGNOSIS — I10 PRIMARY HYPERTENSION: ICD-10-CM

## 2024-07-17 RX ORDER — HYDROCHLOROTHIAZIDE 25 MG/1
25 TABLET ORAL EVERY MORNING
Qty: 90 TABLET | Refills: 0 | Status: SHIPPED | OUTPATIENT
Start: 2024-07-17

## 2024-07-23 DIAGNOSIS — I10 PRIMARY HYPERTENSION: ICD-10-CM

## 2024-07-23 RX ORDER — IBUPROFEN 600 MG/1
600 TABLET ORAL EVERY 6 HOURS PRN
Qty: 120 TABLET | Refills: 0 | Status: SHIPPED | OUTPATIENT
Start: 2024-07-23 | End: 2024-07-26

## 2024-07-23 RX ORDER — AMLODIPINE BESYLATE 5 MG/1
5 TABLET ORAL DAILY
Qty: 90 TABLET | Refills: 0 | Status: SHIPPED | OUTPATIENT
Start: 2024-07-23 | End: 2024-07-26

## 2024-07-23 RX ORDER — CETIRIZINE HYDROCHLORIDE 10 MG/1
10 TABLET ORAL DAILY
Qty: 90 TABLET | Refills: 3 | Status: SHIPPED | OUTPATIENT
Start: 2024-07-23 | End: 2024-07-24 | Stop reason: SDUPTHER

## 2024-07-23 NOTE — TELEPHONE ENCOUNTER
[facility] faxed refill request for Ibuprofen and Cetrizine   Last office visit 5/7/2024

## 2024-07-24 RX ORDER — CETIRIZINE HYDROCHLORIDE 10 MG/1
10 TABLET ORAL DAILY
Qty: 90 TABLET | Refills: 3 | OUTPATIENT
Start: 2024-07-24

## 2024-07-24 RX ORDER — CETIRIZINE HYDROCHLORIDE 10 MG/1
10 TABLET ORAL DAILY
Qty: 90 TABLET | Refills: 3 | Status: SHIPPED | OUTPATIENT
Start: 2024-07-24

## 2024-07-26 ENCOUNTER — OFFICE VISIT (OUTPATIENT)
Dept: INTERNAL MEDICINE CLINIC | Age: 64
End: 2024-07-26

## 2024-07-26 VITALS
DIASTOLIC BLOOD PRESSURE: 70 MMHG | BODY MASS INDEX: 30.82 KG/M2 | WEIGHT: 174 LBS | SYSTOLIC BLOOD PRESSURE: 132 MMHG | OXYGEN SATURATION: 94 % | HEART RATE: 72 BPM

## 2024-07-26 DIAGNOSIS — I10 PRIMARY HYPERTENSION: ICD-10-CM

## 2024-07-26 DIAGNOSIS — Z13.1 ENCOUNTER FOR SCREENING FOR DIABETES MELLITUS: ICD-10-CM

## 2024-07-26 DIAGNOSIS — I67.1 BRAIN ANEURYSM: ICD-10-CM

## 2024-07-26 DIAGNOSIS — I67.1 INTRACRANIAL ANEURYSM: ICD-10-CM

## 2024-07-26 DIAGNOSIS — M85.89 OSTEOPENIA OF MULTIPLE SITES: ICD-10-CM

## 2024-07-26 DIAGNOSIS — E53.8 LOW SERUM VITAMIN B12: Primary | ICD-10-CM

## 2024-07-26 DIAGNOSIS — Z13.220 SCREENING FOR HYPERLIPIDEMIA: ICD-10-CM

## 2024-07-26 DIAGNOSIS — E87.6 HYPOKALEMIA: ICD-10-CM

## 2024-07-26 DIAGNOSIS — D50.9 IRON DEFICIENCY ANEMIA, UNSPECIFIED IRON DEFICIENCY ANEMIA TYPE: ICD-10-CM

## 2024-07-26 RX ORDER — LISINOPRIL AND HYDROCHLOROTHIAZIDE 25; 20 MG/1; MG/1
1 TABLET ORAL DAILY
Qty: 30 TABLET | Refills: 3 | Status: SHIPPED | OUTPATIENT
Start: 2024-07-26

## 2024-07-26 RX ORDER — UREA 10 %
500 LOTION (ML) TOPICAL DAILY
Qty: 30 TABLET | Refills: 3 | Status: SHIPPED | OUTPATIENT
Start: 2024-07-26 | End: 2025-07-26

## 2024-07-26 RX ORDER — POTASSIUM CHLORIDE 20 MEQ/1
10 TABLET, EXTENDED RELEASE ORAL DAILY
Qty: 90 TABLET | Refills: 0 | Status: SHIPPED | OUTPATIENT
Start: 2024-07-26

## 2024-07-26 RX ORDER — HYDROCORTISONE ACETATE 25 MG/1
25 SUPPOSITORY RECTAL 2 TIMES DAILY PRN
Qty: 20 SUPPOSITORY | Refills: 2 | Status: SHIPPED | OUTPATIENT
Start: 2024-07-26

## 2024-07-26 NOTE — PROGRESS NOTES
Visit Information    Have you changed or started any medications since your last visit including any over-the-counter medicines, vitamins, or herbal medicines? no   Are you having any side effects from any of your medications? -  no  Have you stopped taking any of your medications? Is so, why? -  no    Have you seen any other physician or provider since your last visit? Yes - Records Obtained  Have you had any other diagnostic tests since your last visit? No  Have you been seen in the emergency room and/or had an admission to a hospital since we last saw you? No  Have you had your routine dental cleaning in the past 6 months? no    Have you activated your [facility] account? If not, what are your barriers? Yes     Patient Care Team:  Abel Cuenca MD as PCP - General (Internal Medicine)  Abel Cuecna MD as PCP - Empaneled Provider    Medical History Review  Past Medical, Family, and Social History reviewed and does contribute to the patient presenting condition    Health Maintenance   Topic Date Due    HIV screen  Never done    Hepatitis C screen  Never done    Shingles vaccine (1 of 2) Never done    Respiratory Syncytial Virus (RSV) Pregnant or age 60 yrs+ (1 - 1-dose 60+ series) Never done    COVID-19 Vaccine (2 - 2023-24 season) 09/01/2023    Annual Wellness Visit (Medicare Advantage)  Never done    Flu vaccine (1) 08/01/2024    Pneumococcal 65+ years Vaccine (2 of 2 - PCV) 10/30/2024    Depression Screen  05/07/2025    Breast cancer screen  10/18/2025    Colorectal Cancer Screen  11/29/2026    Cervical cancer screen  11/16/2028    Lipids  01/15/2029    DTaP/Tdap/Td vaccine (2 - Td or Tdap) 10/30/2033    DEXA (modify frequency per FRAX score)  Completed    Hepatitis A vaccine  Aged Out    Hepatitis B vaccine  Aged Out    Hib vaccine  Aged Out    Polio vaccine  Aged Out    Meningococcal (ACWY) vaccine  Aged Out    Pneumococcal 0-64 years Vaccine  Discontinued    Diabetes screen  Discontinued       
Future  Brain aneurysm  Osteopenia of multiple sites  -     Hepatic Function Panel; Future  -     Vitamin D 25 Hydroxy; Future  Encounter for screening for diabetes mellitus  -     Glucose, Fasting; Future  Screening for hyperlipidemia  -     Lipid Panel; Future  Intracranial aneurysm  Primary hypertension  Other orders  -     lisinopril-hydroCHLOROthiazide (PRINZIDE;ZESTORETIC) 20-25 MG per tablet; Take 1 tablet by mouth daily  -     vitamin B-12 (CYANOCOBALAMIN) 500 MCG tablet; Take 1 tablet by mouth daily  -     hydrocortisone (ANUSOL-HC) 25 MG suppository; Place 1 suppository rectally 2 times daily as needed for Hemorrhoids      Essential hypertension, blood pressure controlled. On hctz 25 mg p.o. daily, coreg 6.25 mg po bid. Goal would be lower given below. Continue coreg, hr limits uptitration. Will add lisinopril 20 mg po daily. Continue salt restriction and pressures a home   Right mca saccular Brain aneurysm, sees neuro intervention. Last angio in 5/2024. Previously treated right MCA bifurcation aneurysm is largely obliterated, there is a small dog ear shaped residual at the neck WEB OS C and Abel score II. Plans for repeat in 3 months. No headaches, vision or speech problems. Not on aspirin anymore   Allergies, controlled on cetrizine  Colonoscopy in 2023, recs to repeat in 3 years  Mammogram 2023 unremarkable   Goes to obgyn for pap smears  H/o iron deficiency , recheck numbers. No reported bleeding  Osteopenia on bone scan 2023, check vitamin d levels  Large hemorrhoids, intermittent bleed    FOLLOW UP AND INSTRUCTIONS:   Return in about 3 months (around 10/26/2024), or if symptoms worsen or fail to improve.    Miladys received counseling on the following healthy behaviors: nutrition, exercise, and medication adherence    Discussed use, benefit, and side effects of prescribed medications.  Barriers to medication compliance addressed.  All patient questions answered.  Pt voiced understanding.

## 2024-08-02 ENCOUNTER — OFFICE VISIT (OUTPATIENT)
Dept: NEUROLOGY | Age: 64
End: 2024-08-02
Payer: COMMERCIAL

## 2024-08-02 VITALS
HEIGHT: 63 IN | SYSTOLIC BLOOD PRESSURE: 148 MMHG | BODY MASS INDEX: 30.83 KG/M2 | HEART RATE: 73 BPM | OXYGEN SATURATION: 94 % | DIASTOLIC BLOOD PRESSURE: 80 MMHG | WEIGHT: 174 LBS

## 2024-08-02 DIAGNOSIS — I67.1 INTRACRANIAL ANEURYSM: Primary | ICD-10-CM

## 2024-08-02 PROCEDURE — 3077F SYST BP >= 140 MM HG: CPT | Performed by: STUDENT IN AN ORGANIZED HEALTH CARE EDUCATION/TRAINING PROGRAM

## 2024-08-02 PROCEDURE — 3079F DIAST BP 80-89 MM HG: CPT | Performed by: STUDENT IN AN ORGANIZED HEALTH CARE EDUCATION/TRAINING PROGRAM

## 2024-08-02 PROCEDURE — 1123F ACP DISCUSS/DSCN MKR DOCD: CPT | Performed by: STUDENT IN AN ORGANIZED HEALTH CARE EDUCATION/TRAINING PROGRAM

## 2024-08-02 PROCEDURE — 99215 OFFICE O/P EST HI 40 MIN: CPT | Performed by: STUDENT IN AN ORGANIZED HEALTH CARE EDUCATION/TRAINING PROGRAM

## 2024-08-02 RX ORDER — EPINEPHRINE 0.3 MG/.3ML
0.3 INJECTION SUBCUTANEOUS PRN
COMMUNITY
Start: 2024-07-25

## 2024-08-04 NOTE — PROGRESS NOTES
Endovascular Neurosurgery Clinic Note    Pt Name: Miladys Baker  MRN: [id number]  YOB: 1960  Date of evaluation: 8/4/2024  Primary Care Physician: Dom Campbell MD  Patient evaluated at the request of  Dr. 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  Discussed the findings of her DSA.  She prefers to get her MRA sooner than 1 year    History of Chief Complaint 1/12/2024:    Miladys Baker is a 63 y.o. female who presents with with sudden onset thunderclap headache with neck stiffness.  The patient denies any other focal neurological deficits.  CT head unrevealing for acute bleeding.  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     Of note, the patient has a very extensive significant family history of ruptured cerebral aneurysm (grandfather, father, paternal aunt who happened to be one of our patients as well)    The patient denies any history of vasculitis, kidney diseases, smoking.       Modified Luciano scale 0  Hunt and Ibarra 2    Review of Systems:  CONSTITUTIONAL:  negative for fevers, chills, fatigue and malaise    EYES:  negative for double vision, blurred vision and photophobia     HEENT:  negative for tinnitus, epistaxis and sore throat    RESPIRATORY:  negative for cough, shortness of breath, wheezing    CARDIOVASCULAR:  negative for chest pain, palpitations, syncope, edema    GASTROINTESTINAL:  negative for nausea, vomiting    GENITOURINARY:  negative for incontinence    MUSCULOSKELETAL:  negative for neck or back pain    NEUROLOGICAL:  Negative for weakness and tingling  negative for headaches and dizziness    PSYCHIATRIC:  negative for anxiety      Review of systems otherwise negative.      OBJECTIVE:   Vitals: BP (!) 148/80   Pulse 73   Ht 1.6 m (5' 3\")   Wt 78.9 kg (174 lb)   SpO2 94%   BMI 30.82 kg/m²

## 2024-09-06 RX ORDER — LISINOPRIL AND HYDROCHLOROTHIAZIDE 20; 25 MG/1; MG/1
1 TABLET ORAL DAILY
Qty: 30 TABLET | Refills: 3 | Status: SHIPPED | OUTPATIENT
Start: 2024-09-06

## 2024-09-06 NOTE — TELEPHONE ENCOUNTER
[facility]Z  Last office visit 7/26/2024

## 2024-09-24 ENCOUNTER — HOSPITAL ENCOUNTER (OUTPATIENT)
Age: 64
Discharge: HOME | End: 2024-09-24
Payer: COMMERCIAL

## 2024-09-24 DIAGNOSIS — D50.9 IRON DEFICIENCY ANEMIA, UNSPECIFIED IRON DEFICIENCY ANEMIA TYPE: ICD-10-CM

## 2024-09-24 DIAGNOSIS — Z13.1 ENCOUNTER FOR SCREENING FOR DIABETES MELLITUS: ICD-10-CM

## 2024-09-24 DIAGNOSIS — E87.6 HYPOKALEMIA: ICD-10-CM

## 2024-09-24 DIAGNOSIS — M85.89 OSTEOPENIA OF MULTIPLE SITES: ICD-10-CM

## 2024-09-24 DIAGNOSIS — Z13.220 SCREENING FOR HYPERLIPIDEMIA: ICD-10-CM

## 2024-09-24 LAB
25(OH)D3 SERPL-MCNC: 21.7 NG/ML (ref 30–100)
ALBUMIN SERPL-MCNC: 4.3 G/DL (ref 3.5–5.2)
ALP SERPL-CCNC: 66 U/L (ref 35–104)
ALT SERPL-CCNC: 15 U/L (ref 5–33)
ANION GAP SERPL CALCULATED.3IONS-SCNC: 11 MMOL/L (ref 9–17)
AST SERPL-CCNC: 24 U/L
BILIRUB DIRECT SERPL-MCNC: 0.1 MG/DL
BILIRUB INDIRECT SERPL-MCNC: 0.5 MG/DL (ref 0–1)
BILIRUB SERPL-MCNC: 0.6 MG/DL (ref 0.3–1.2)
BUN SERPL-MCNC: 16 MG/DL (ref 8–23)
CALCIUM SERPL-MCNC: 9.4 MG/DL (ref 8.6–10.4)
CHLORIDE SERPL-SCNC: 101 MMOL/L (ref 98–107)
CHOLEST SERPL-MCNC: 186 MG/DL
CHOLESTEROL/HDL RATIO: 2.8
CO2 SERPL-SCNC: 29 MMOL/L (ref 20–31)
CREAT SERPL-MCNC: 0.8 MG/DL (ref 0.5–0.9)
ERYTHROCYTE [DISTWIDTH] IN BLOOD BY AUTOMATED COUNT: 12.9 % (ref 11.5–14.9)
FERRITIN SERPL-MCNC: 142 NG/ML (ref 13–150)
GFR, ESTIMATED: 82 ML/MIN/1.73M2
GLUCOSE SERPL-MCNC: 103 MG/DL (ref 70–99)
HCT VFR BLD AUTO: 39.1 % (ref 36–46)
HDLC SERPL-MCNC: 66 MG/DL
HGB BLD-MCNC: 13.4 G/DL (ref 12–16)
IRON SATN MFR SERPL: 27 % (ref 20–55)
IRON SERPL-MCNC: 98 UG/DL (ref 37–145)
LDLC SERPL CALC-MCNC: 104 MG/DL (ref 0–130)
MCH RBC QN AUTO: 30.9 PG (ref 26–34)
MCHC RBC AUTO-ENTMCNC: 34.4 G/DL (ref 31–37)
MCV RBC AUTO: 89.8 FL (ref 80–100)
PLATELET # BLD AUTO: 243 K/UL (ref 150–450)
PMV BLD AUTO: 7.6 FL (ref 6–12)
POTASSIUM SERPL-SCNC: 3.5 MMOL/L (ref 3.7–5.3)
PROT SERPL-MCNC: 8.1 G/DL (ref 6.4–8.3)
RBC # BLD AUTO: 4.35 M/UL (ref 4–5.2)
SODIUM SERPL-SCNC: 141 MMOL/L (ref 135–144)
TIBC SERPL-MCNC: 366 UG/DL (ref 250–450)
TRIGL SERPL-MCNC: 81 MG/DL
UNSATURATED IRON BINDING CAPACITY: 268 UG/DL (ref 112–347)
WBC OTHER # BLD: 4.4 K/UL (ref 3.5–11)

## 2024-09-24 PROCEDURE — 82306 VITAMIN D 25 HYDROXY: CPT

## 2024-09-24 PROCEDURE — 36415 COLL VENOUS BLD VENIPUNCTURE: CPT

## 2024-09-24 PROCEDURE — 80048 BASIC METABOLIC PNL TOTAL CA: CPT

## 2024-09-24 PROCEDURE — 82728 ASSAY OF FERRITIN: CPT

## 2024-09-24 PROCEDURE — 83540 ASSAY OF IRON: CPT

## 2024-09-24 PROCEDURE — 80076 HEPATIC FUNCTION PANEL: CPT

## 2024-09-24 PROCEDURE — 80061 LIPID PANEL: CPT

## 2024-09-24 PROCEDURE — 83550 IRON BINDING TEST: CPT

## 2024-09-24 PROCEDURE — 85027 COMPLETE CBC AUTOMATED: CPT

## 2024-09-25 ENCOUNTER — TELEPHONE (OUTPATIENT)
Dept: INTERNAL MEDICINE CLINIC | Age: 64
End: 2024-09-25

## 2024-09-25 DIAGNOSIS — E55.9 VITAMIN D DEFICIENCY: Primary | ICD-10-CM

## 2024-09-25 DIAGNOSIS — E87.6 HYPOKALEMIA: ICD-10-CM

## 2024-09-25 RX ORDER — ERGOCALCIFEROL 1.25 MG/1
50000 CAPSULE, LIQUID FILLED ORAL WEEKLY
Qty: 12 CAPSULE | Refills: 1 | Status: SHIPPED | OUTPATIENT
Start: 2024-09-25

## 2024-09-25 RX ORDER — POTASSIUM CHLORIDE 1500 MG/1
20 TABLET, EXTENDED RELEASE ORAL DAILY
Qty: 90 TABLET | Refills: 0 | Status: SHIPPED | OUTPATIENT
Start: 2024-09-25

## 2024-10-08 ENCOUNTER — OFFICE VISIT (OUTPATIENT)
Dept: PRIMARY CARE CLINIC | Age: 64
End: 2024-10-08
Payer: COMMERCIAL

## 2024-10-08 VITALS
OXYGEN SATURATION: 99 % | HEART RATE: 77 BPM | DIASTOLIC BLOOD PRESSURE: 70 MMHG | WEIGHT: 180.2 LBS | BODY MASS INDEX: 31.92 KG/M2 | SYSTOLIC BLOOD PRESSURE: 144 MMHG

## 2024-10-08 DIAGNOSIS — E55.9 VITAMIN D DEFICIENCY: ICD-10-CM

## 2024-10-08 DIAGNOSIS — E87.6 HYPOKALEMIA: Primary | ICD-10-CM

## 2024-10-08 DIAGNOSIS — I10 PRIMARY HYPERTENSION: ICD-10-CM

## 2024-10-08 PROCEDURE — 3078F DIAST BP <80 MM HG: CPT | Performed by: STUDENT IN AN ORGANIZED HEALTH CARE EDUCATION/TRAINING PROGRAM

## 2024-10-08 PROCEDURE — 99214 OFFICE O/P EST MOD 30 MIN: CPT | Performed by: STUDENT IN AN ORGANIZED HEALTH CARE EDUCATION/TRAINING PROGRAM

## 2024-10-08 PROCEDURE — 3077F SYST BP >= 140 MM HG: CPT | Performed by: STUDENT IN AN ORGANIZED HEALTH CARE EDUCATION/TRAINING PROGRAM

## 2024-10-08 PROCEDURE — 1123F ACP DISCUSS/DSCN MKR DOCD: CPT | Performed by: STUDENT IN AN ORGANIZED HEALTH CARE EDUCATION/TRAINING PROGRAM

## 2024-10-08 RX ORDER — CARVEDILOL 12.5 MG/1
12.5 TABLET ORAL 2 TIMES DAILY
Qty: 60 TABLET | Refills: 2 | Status: CANCELLED | OUTPATIENT
Start: 2024-10-08

## 2024-10-08 ASSESSMENT — ENCOUNTER SYMPTOMS
ABDOMINAL PAIN: 0
SHORTNESS OF BREATH: 0
VOMITING: 0
NAUSEA: 0

## 2024-10-08 NOTE — PROGRESS NOTES
Diabetes Mother     Heart Failure Mother     Heart Failure Father     Diabetes Father     Diabetes Sister     Diabetes Brother     Cancer Maternal Grandmother     Cancer Maternal Grandfather     Cancer Paternal Grandmother     Heart Attack Paternal Grandmother     Hypertension Paternal Grandmother     Cancer Paternal Grandfather     Hypertension Paternal Grandfather        Social History     Tobacco Use    Smoking status: Never    Smokeless tobacco: Never   Substance Use Topics    Alcohol use: Never     Comment: casual      Current Outpatient Medications   Medication Sig Dispense Refill    potassium chloride (KLOR-CON M) 20 MEQ extended release tablet Take 1 tablet by mouth daily 90 tablet 0    vitamin D (ERGOCALCIFEROL) 1.25 MG (94986 UT) CAPS capsule Take 1 capsule by mouth once a week 12 capsule 1    lisinopril-hydroCHLOROthiazide (PRINZIDE;ZESTORETIC) 20-25 MG per tablet Take 1 tablet by mouth daily 30 tablet 3    EPINEPHrine (EPIPEN) 0.3 MG/0.3ML SOAJ injection Inject 0.3 mLs into the muscle as needed      vitamin B-12 (CYANOCOBALAMIN) 500 MCG tablet Take 1 tablet by mouth daily 30 tablet 3    cetirizine (ZYRTEC) 10 MG tablet Take 1 tablet by mouth daily 90 tablet 3    azelastine (ASTELIN) 0.1 % nasal spray 1 spray by Nasal route 2 times daily      olopatadine (PATANOL) 0.1 % ophthalmic solution Apply 1 drop to eye 2 times daily      carvedilol (COREG) 6.25 MG tablet Take 1 tablet by mouth 2 times daily 60 tablet 1    betamethasone dipropionate 0.05 % lotion Apply on neck topically 2 times daily. 1 each 1    albuterol sulfate HFA (PROVENTIL;VENTOLIN;PROAIR) 108 (90 Base) MCG/ACT inhaler Inhale 2 puffs into the lungs every 6 hours as needed for Wheezing 18 g 3    hydrocortisone (ANUSOL-HC) 25 MG suppository Place 1 suppository rectally 2 times daily as needed for Hemorrhoids (Patient not taking: Reported on 10/8/2024) 20 suppository 2     No current facility-administered medications for this visit.     Not on

## 2024-10-17 DIAGNOSIS — I10 PRIMARY HYPERTENSION: ICD-10-CM

## 2024-10-17 RX ORDER — HYDROCHLOROTHIAZIDE 25 MG/1
25 TABLET ORAL EVERY MORNING
Qty: 90 TABLET | OUTPATIENT
Start: 2024-10-17

## 2024-11-20 ENCOUNTER — HOSPITAL ENCOUNTER (OUTPATIENT)
Dept: MRI IMAGING | Age: 64
Discharge: HOME | End: 2024-11-22
Payer: MEDICARE

## 2024-11-20 DIAGNOSIS — I67.1 INTRACRANIAL ANEURYSM: ICD-10-CM

## 2024-11-20 LAB
EGFR, POC: >90 ML/MIN/1.73M2
POC CREATININE: 0.7 MG/DL (ref 0.51–1.19)

## 2024-11-20 PROCEDURE — 82565 ASSAY OF CREATININE: CPT

## 2024-11-20 PROCEDURE — A9579 GAD-BASE MR CONTRAST NOS,1ML: HCPCS | Performed by: STUDENT IN AN ORGANIZED HEALTH CARE EDUCATION/TRAINING PROGRAM

## 2024-11-20 PROCEDURE — 70546 MR ANGIOGRAPH HEAD W/O&W/DYE: CPT

## 2024-11-20 PROCEDURE — 6360000004 HC RX CONTRAST MEDICATION: Performed by: STUDENT IN AN ORGANIZED HEALTH CARE EDUCATION/TRAINING PROGRAM

## 2024-11-20 PROCEDURE — 2580000003 HC RX 258: Performed by: STUDENT IN AN ORGANIZED HEALTH CARE EDUCATION/TRAINING PROGRAM

## 2024-11-20 RX ORDER — SODIUM CHLORIDE 0.9 % (FLUSH) 0.9 %
10 SYRINGE (ML) INJECTION PRN
Status: DISCONTINUED | OUTPATIENT
Start: 2024-11-20 | End: 2024-11-23 | Stop reason: HOSPADM

## 2024-11-20 RX ADMIN — GADOTERIDOL 16 ML: 279.3 INJECTION, SOLUTION INTRAVENOUS at 11:05

## 2024-11-20 RX ADMIN — SODIUM CHLORIDE, PRESERVATIVE FREE 10 ML: 5 INJECTION INTRAVENOUS at 11:05

## 2024-12-09 ENCOUNTER — OFFICE VISIT (OUTPATIENT)
Dept: NEUROLOGY | Age: 64
End: 2024-12-09
Payer: MEDICARE

## 2024-12-09 VITALS
HEART RATE: 89 BPM | HEIGHT: 62 IN | SYSTOLIC BLOOD PRESSURE: 144 MMHG | DIASTOLIC BLOOD PRESSURE: 87 MMHG | BODY MASS INDEX: 33.13 KG/M2 | WEIGHT: 180 LBS

## 2024-12-09 DIAGNOSIS — I67.1 INTRACRANIAL ANEURYSM: Primary | ICD-10-CM

## 2024-12-09 PROCEDURE — 99215 OFFICE O/P EST HI 40 MIN: CPT | Performed by: PSYCHIATRY & NEUROLOGY

## 2024-12-09 PROCEDURE — 1159F MED LIST DOCD IN RCRD: CPT | Performed by: PSYCHIATRY & NEUROLOGY

## 2024-12-09 PROCEDURE — 1123F ACP DISCUSS/DSCN MKR DOCD: CPT | Performed by: PSYCHIATRY & NEUROLOGY

## 2024-12-09 PROCEDURE — 3077F SYST BP >= 140 MM HG: CPT | Performed by: PSYCHIATRY & NEUROLOGY

## 2024-12-09 PROCEDURE — 3079F DIAST BP 80-89 MM HG: CPT | Performed by: PSYCHIATRY & NEUROLOGY

## 2024-12-09 RX ORDER — BENZONATATE 200 MG/1
200 CAPSULE ORAL 3 TIMES DAILY PRN
COMMUNITY
Start: 2024-12-02 | End: 2024-12-12

## 2024-12-09 NOTE — PROGRESS NOTES
Endovascular Neurosurgery Clinic Note    Pt Name: Miladys Baker  MRN: [id number]  YOB: 1960  Date of evaluation: 12/9/2024  Primary Care Physician: Abel Cuenca MD  Patient evaluated at the request of  Dr. 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 female who presents with with sudden onset thunderclap headache with neck stiffness.  The patient denies any other focal neurological deficits.  CT head unrevealing for acute bleeding.  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     Of note, the patient has a very extensive significant family history of ruptured cerebral aneurysm (grandfather, father, paternal aunt who happened to be one of our patients as well)    The patient denies any history of vasculitis, kidney diseases, smoking.     Doing well, no complaint. Will proceed with a follow-up DSA in 12 months from first embolization     Modified Luciano scale 0  Hunt and Ibarra 2    Review of Systems:  CONSTITUTIONAL:  negative for fevers, chills, fatigue and malaise    EYES:  negative for double vision, blurred vision and photophobia     HEENT:  negative for tinnitus, epistaxis and sore throat    RESPIRATORY:  negative for cough, shortness of breath, wheezing    CARDIOVASCULAR:  negative for chest pain, palpitations, syncope, edema    GASTROINTESTINAL:  negative for nausea, vomiting    GENITOURINARY:  negative for incontinence    MUSCULOSKELETAL:  negative for neck or back pain    NEUROLOGICAL:  Negative for weakness and tingling  negative for headaches and dizziness    PSYCHIATRIC:  negative for anxiety      Review of systems otherwise negative.      OBJECTIVE:   Vitals: BP (!) 144/87   Pulse 89   Ht 1.575 m (5' 2\")   Wt 81.6 kg (180 lb)   BMI 32.92 kg/m²     Neuro Exam:  Mental Status: Awake, alert oriented to person,

## 2024-12-13 DIAGNOSIS — I67.1 INTRACRANIAL ANEURYSM: Primary | ICD-10-CM

## 2025-01-02 ENCOUNTER — HOSPITAL ENCOUNTER (OUTPATIENT)
Dept: INTERVENTIONAL RADIOLOGY/VASCULAR | Age: 65
Discharge: HOME | End: 2025-01-04
Payer: MEDICARE

## 2025-01-02 VITALS
DIASTOLIC BLOOD PRESSURE: 79 MMHG | HEIGHT: 60 IN | BODY MASS INDEX: 34.55 KG/M2 | WEIGHT: 176 LBS | TEMPERATURE: 97.3 F | SYSTOLIC BLOOD PRESSURE: 163 MMHG | OXYGEN SATURATION: 99 % | HEART RATE: 69 BPM

## 2025-01-02 DIAGNOSIS — I67.1 INTRACRANIAL ANEURYSM: ICD-10-CM

## 2025-01-02 LAB
BUN BLD-MCNC: 15 MG/DL (ref 8–26)
CHLORIDE BLD-SCNC: 106 MMOL/L (ref 98–107)
EGFR, POC: >90 ML/MIN/1.73M2
GLUCOSE BLD-MCNC: 106 MG/DL (ref 74–100)
HCT VFR BLD AUTO: 38 % (ref 36–46)
POC CREATININE: 0.7 MG/DL (ref 0.51–1.19)
POC HEMOGLOBIN (CALC): 13 G/DL (ref 12–16)
POTASSIUM BLD-SCNC: 5 MMOL/L (ref 3.5–4.5)
SODIUM BLD-SCNC: 144 MMOL/L (ref 138–146)

## 2025-01-02 PROCEDURE — 84132 ASSAY OF SERUM POTASSIUM: CPT

## 2025-01-02 PROCEDURE — 85014 HEMATOCRIT: CPT

## 2025-01-02 PROCEDURE — 84520 ASSAY OF UREA NITROGEN: CPT

## 2025-01-02 PROCEDURE — 82565 ASSAY OF CREATININE: CPT

## 2025-01-02 PROCEDURE — 82435 ASSAY OF BLOOD CHLORIDE: CPT

## 2025-01-02 PROCEDURE — 99153 MOD SED SAME PHYS/QHP EA: CPT

## 2025-01-02 PROCEDURE — 36224 PLACE CATH CAROTD ART: CPT

## 2025-01-02 PROCEDURE — 99152 MOD SED SAME PHYS/QHP 5/>YRS: CPT

## 2025-01-02 PROCEDURE — C1769 GUIDE WIRE: HCPCS

## 2025-01-02 PROCEDURE — 6360000002 HC RX W HCPCS: Performed by: STUDENT IN AN ORGANIZED HEALTH CARE EDUCATION/TRAINING PROGRAM

## 2025-01-02 PROCEDURE — 82947 ASSAY GLUCOSE BLOOD QUANT: CPT

## 2025-01-02 PROCEDURE — 84295 ASSAY OF SERUM SODIUM: CPT

## 2025-01-02 PROCEDURE — 6360000004 HC RX CONTRAST MEDICATION: Performed by: STUDENT IN AN ORGANIZED HEALTH CARE EDUCATION/TRAINING PROGRAM

## 2025-01-02 RX ORDER — IODIXANOL 270 MG/ML
100 INJECTION, SOLUTION INTRAVASCULAR
Status: COMPLETED | OUTPATIENT
Start: 2025-01-02 | End: 2025-01-02

## 2025-01-02 RX ORDER — HEPARIN SODIUM 5000 [USP'U]/ML
INJECTION, SOLUTION INTRAVENOUS; SUBCUTANEOUS PRN
Status: COMPLETED | OUTPATIENT
Start: 2025-01-02 | End: 2025-01-02

## 2025-01-02 RX ORDER — SODIUM CHLORIDE 0.9 % (FLUSH) 0.9 %
5-40 SYRINGE (ML) INJECTION EVERY 12 HOURS SCHEDULED
Status: DISCONTINUED | OUTPATIENT
Start: 2025-01-02 | End: 2025-01-05 | Stop reason: HOSPADM

## 2025-01-02 RX ORDER — ONDANSETRON 2 MG/ML
4 INJECTION INTRAMUSCULAR; INTRAVENOUS EVERY 6 HOURS PRN
Status: DISCONTINUED | OUTPATIENT
Start: 2025-01-02 | End: 2025-01-05 | Stop reason: HOSPADM

## 2025-01-02 RX ORDER — FENTANYL CITRATE 50 UG/ML
INJECTION, SOLUTION INTRAMUSCULAR; INTRAVENOUS PRN
Status: COMPLETED | OUTPATIENT
Start: 2025-01-02 | End: 2025-01-02

## 2025-01-02 RX ORDER — SODIUM CHLORIDE 9 MG/ML
INJECTION, SOLUTION INTRAVENOUS CONTINUOUS
OUTPATIENT
Start: 2025-01-02

## 2025-01-02 RX ORDER — ONDANSETRON 4 MG/1
4 TABLET, ORALLY DISINTEGRATING ORAL EVERY 8 HOURS PRN
Status: DISCONTINUED | OUTPATIENT
Start: 2025-01-02 | End: 2025-01-05 | Stop reason: HOSPADM

## 2025-01-02 RX ORDER — SODIUM CHLORIDE 0.9 % (FLUSH) 0.9 %
5-40 SYRINGE (ML) INJECTION PRN
Status: DISCONTINUED | OUTPATIENT
Start: 2025-01-02 | End: 2025-01-05 | Stop reason: HOSPADM

## 2025-01-02 RX ORDER — MIDAZOLAM HYDROCHLORIDE 2 MG/2ML
INJECTION, SOLUTION INTRAMUSCULAR; INTRAVENOUS PRN
Status: COMPLETED | OUTPATIENT
Start: 2025-01-02 | End: 2025-01-02

## 2025-01-02 RX ORDER — SODIUM CHLORIDE 9 MG/ML
INJECTION, SOLUTION INTRAVENOUS PRN
Status: DISCONTINUED | OUTPATIENT
Start: 2025-01-02 | End: 2025-01-05 | Stop reason: HOSPADM

## 2025-01-02 RX ADMIN — FENTANYL CITRATE 50 MCG: 50 INJECTION, SOLUTION INTRAMUSCULAR; INTRAVENOUS at 08:42

## 2025-01-02 RX ADMIN — HEPARIN SODIUM 2000 UNITS: 5000 INJECTION INTRAVENOUS; SUBCUTANEOUS at 08:47

## 2025-01-02 RX ADMIN — Medication 2000 MG: at 08:43

## 2025-01-02 RX ADMIN — MIDAZOLAM HYDROCHLORIDE 1 MG: 1 INJECTION, SOLUTION INTRAMUSCULAR; INTRAVENOUS at 08:43

## 2025-01-02 RX ADMIN — IODIXANOL 38 ML: 270 INJECTION, SOLUTION INTRAVASCULAR at 09:15

## 2025-01-02 RX ADMIN — FENTANYL CITRATE 25 MCG: 50 INJECTION, SOLUTION INTRAMUSCULAR; INTRAVENOUS at 09:15

## 2025-01-02 NOTE — PROGRESS NOTES
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 Rob Garcia    [place]:  Cuauhtemoc Flowers MD, Peter Lee MD    Contrast: 38 cc of Visipaque-270.    Fluoroscopy time: 7.4 minutes    Access: Right Common Femoral Artery    Consent:   After explaining the risks and benefits to the patient and the patient's family, including but not limited to stroke, coma, death, vessel injury, dissection, tear, occlusion, and X-ray dye allergic type reaction, a signed consent form was obtained.     Indication and Clinical History:   Miladys Baker is a 64 y.o. 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 Pt was referred for a follow-up diagnostic angiogram.      Anesthesia:   Local anesthesia with lidocaine. IV moderate sedation with Versed and Fentanyl. IV moderate sedation was supervised by the attending physician. The patient was independently monitored by a registered nurse assigned to the Department of Radiology using automated blood pressure, EKG and pulse oximetry. The detailed Conscious Record is permanently stored in the Hospital Information System.      Description and findings:   The patient's right groin was prepped and draped in standard sterile fashion and under local anesthesia with conscious sedation.

## 2025-01-02 NOTE — PROGRESS NOTES
All discharge instructions reviewed with patient, questions answered.  Patient discharged per wheelchair with son and belongings.

## 2025-01-02 NOTE — OR NURSING
Report to Janessa RN  Pt remains alert and oriented  Speech clear  ALFONSO with normal, purposeful strength  Denies HA   Sensation intact  Left PPP weak, palpable  Right PPP strong  Feet warm  Site CDI, no hematoma or ecchymosis  Quick clot CDI

## 2025-01-02 NOTE — H&P
Endovascular Neurosurgery Note    Pt Name: Miladys Baker  MRN: [id number]  YOB: 1960  Date of evaluation: 1/2/2025  Primary Care Physician: Abel Cuenca MD  Patient evaluated at the request of  Dr. Ott     Reason for evaluation:     R MCA bifurcation giant aneurysm s/p WEB embolization 1/13/2024      Subjective:     Presents for scheduled elective follow-up DSA. Has no complaints, no new focal neuro deficits. Not on any AC/AP agents.    HPI     Miladys Baker is a 64 y.o. female who presents with with sudden onset thunderclap headache with neck stiffness.  The patient denies any other focal neurological deficits.  CT head unrevealing for acute bleeding.  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     Of note, the patient has a very extensive significant family history of ruptured cerebral aneurysm (grandfather, father, paternal aunt who happened to be one of our patients as well)    The patient denies any history of vasculitis, kidney diseases, smoking.     Doing well, no complaint. Will proceed with a follow-up DSA in 12 months from first embolization     Modified Luciano scale 0  Hunt and Ibarra 2    Review of Systems:  CONSTITUTIONAL:  negative for fevers, chills, fatigue and malaise    EYES:  negative for double vision, blurred vision and photophobia     HEENT:  negative for tinnitus, epistaxis and sore throat    RESPIRATORY:  negative for cough, shortness of breath, wheezing    CARDIOVASCULAR:  negative for chest pain, palpitations, syncope, edema    GASTROINTESTINAL:  negative for nausea, vomiting    GENITOURINARY:  negative for incontinence    MUSCULOSKELETAL:  negative for neck or back pain    NEUROLOGICAL:  Negative for weakness and tingling  negative for headaches and dizziness    PSYCHIATRIC:  negative for anxiety      Review of systems otherwise negative.      OBJECTIVE:   Vitals: BP (!) 178/71   Pulse 74

## 2025-01-02 NOTE — OR NURSING
Pt to IR for dx angio  Alert and oriented, speech clear and appropriate  ALFONSO with normal, purposeful strength  C/O mild chronic HA, relates to BP  Sensation intact  PPP to right strong   PPP to left weak, palpable

## 2025-01-02 NOTE — PROGRESS NOTES
Patient admitted, consent signed, all questions answered.  Pt ready for procedure.  Bed in low position, call light to reach with side rails up 2 of 2.   Family  at bedside with patient.

## 2025-01-02 NOTE — DISCHARGE INSTRUCTIONS
24 hours.  \" Do not operate equipment for 24 hours (lawnmowers, power tools, kitchen accessories, stove).  \" Do not drink any alcoholic beverages for a minimum of 24 hours.  \" Do not make important personal, legal or business decisions for 24 hours.  \" You may experience dizziness or lightheadedness. Move slowly and carefully, do not make sudden position changes.  \" Drink extra amounts of fluids today.  \" Increase your diet as tolerated (unless you have received specific instructions from your doctor).  \" If you feel nauseated, continue with liquids until the nausea is gone.  \" Notify your physician if you have not urinated within 8 hours after the procedure.  \" Resume your medications unless otherwise instructed          Vertebral artery dissection is less common than carotid artery dissection (dissection of the large arteries in the front of the neck). The two conditions combined account for 10-25% of non-hemorrhagic strokes in young and middle-aged people. Over 75% recover completely or with minimal impact on functioning, with the remainder having more severe disability and a very small proportion (about 2%) dying from complications. It was first described in the 1970s by the [place] neurologist Jonel Luciano

## 2025-01-31 ENCOUNTER — OFFICE VISIT (OUTPATIENT)
Dept: INTERNAL MEDICINE CLINIC | Age: 65
End: 2025-01-31
Payer: MEDICARE

## 2025-01-31 VITALS
WEIGHT: 176 LBS | DIASTOLIC BLOOD PRESSURE: 84 MMHG | OXYGEN SATURATION: 100 % | BODY MASS INDEX: 35.48 KG/M2 | HEIGHT: 59 IN | SYSTOLIC BLOOD PRESSURE: 138 MMHG | HEART RATE: 62 BPM

## 2025-01-31 DIAGNOSIS — Z12.39 BREAST SCREENING: ICD-10-CM

## 2025-01-31 DIAGNOSIS — K64.9 HEMORRHOIDS, UNSPECIFIED HEMORRHOID TYPE: Primary | ICD-10-CM

## 2025-01-31 DIAGNOSIS — E87.6 HYPOKALEMIA: ICD-10-CM

## 2025-01-31 DIAGNOSIS — K64.9 HEMORRHOIDS, UNSPECIFIED HEMORRHOID TYPE: ICD-10-CM

## 2025-01-31 DIAGNOSIS — L60.9 NAIL ABNORMALITIES: ICD-10-CM

## 2025-01-31 PROCEDURE — 99214 OFFICE O/P EST MOD 30 MIN: CPT | Performed by: INTERNAL MEDICINE

## 2025-01-31 PROCEDURE — 1159F MED LIST DOCD IN RCRD: CPT | Performed by: INTERNAL MEDICINE

## 2025-01-31 PROCEDURE — 3079F DIAST BP 80-89 MM HG: CPT | Performed by: INTERNAL MEDICINE

## 2025-01-31 PROCEDURE — 1123F ACP DISCUSS/DSCN MKR DOCD: CPT | Performed by: INTERNAL MEDICINE

## 2025-01-31 PROCEDURE — 3075F SYST BP GE 130 - 139MM HG: CPT | Performed by: INTERNAL MEDICINE

## 2025-01-31 RX ORDER — HYDROCORTISONE ACETATE 25 MG/1
25 SUPPOSITORY RECTAL 2 TIMES DAILY PRN
Qty: 20 SUPPOSITORY | Refills: 2 | Status: CANCELLED | OUTPATIENT
Start: 2025-01-31

## 2025-01-31 RX ORDER — MULTIVITAMIN WITH IRON
500 TABLET ORAL DAILY
Qty: 30 TABLET | Refills: 3 | Status: SHIPPED | OUTPATIENT
Start: 2025-01-31 | End: 2026-01-31

## 2025-01-31 RX ORDER — POTASSIUM CHLORIDE 1500 MG/1
20 TABLET, EXTENDED RELEASE ORAL DAILY
Qty: 90 TABLET | Refills: 0 | Status: SHIPPED | OUTPATIENT
Start: 2025-01-31

## 2025-01-31 RX ORDER — HYDROCORTISONE ACETATE 25 MG/1
25 SUPPOSITORY RECTAL EVERY 12 HOURS
Qty: 1 SUPPOSITORY | Refills: 2 | Status: SHIPPED | OUTPATIENT
Start: 2025-01-31 | End: 2025-01-31 | Stop reason: SDUPTHER

## 2025-01-31 RX ORDER — HYDROCORTISONE ACETATE 25 MG/1
25 SUPPOSITORY RECTAL EVERY 12 HOURS
Qty: 1 SUPPOSITORY | Refills: 2 | Status: SHIPPED | OUTPATIENT
Start: 2025-01-31

## 2025-01-31 RX ORDER — POTASSIUM CHLORIDE 1500 MG/1
20 TABLET, EXTENDED RELEASE ORAL DAILY
Qty: 90 TABLET | Refills: 0 | OUTPATIENT
Start: 2025-01-31

## 2025-01-31 SDOH — ECONOMIC STABILITY: FOOD INSECURITY: WITHIN THE PAST 12 MONTHS, THE FOOD YOU BOUGHT JUST DIDN'T LAST AND YOU DIDN'T HAVE MONEY TO GET MORE.: PATIENT DECLINED

## 2025-01-31 SDOH — ECONOMIC STABILITY: FOOD INSECURITY: WITHIN THE PAST 12 MONTHS, YOU WORRIED THAT YOUR FOOD WOULD RUN OUT BEFORE YOU GOT MONEY TO BUY MORE.: PATIENT DECLINED

## 2025-01-31 ASSESSMENT — PATIENT HEALTH QUESTIONNAIRE - PHQ9
2. FEELING DOWN, DEPRESSED OR HOPELESS: NOT AT ALL
SUM OF ALL RESPONSES TO PHQ QUESTIONS 1-9: 0
1. LITTLE INTEREST OR PLEASURE IN DOING THINGS: NOT AT ALL
SUM OF ALL RESPONSES TO PHQ9 QUESTIONS 1 & 2: 0
SUM OF ALL RESPONSES TO PHQ QUESTIONS 1-9: 0

## 2025-01-31 ASSESSMENT — ENCOUNTER SYMPTOMS
COUGH: 0
COLOR CHANGE: 0
TROUBLE SWALLOWING: 0
ABDOMINAL DISTENTION: 0
WHEEZING: 0
BLOOD IN STOOL: 0
EYE DISCHARGE: 0
SHORTNESS OF BREATH: 0
DIARRHEA: 0
EYE PAIN: 0

## 2025-02-03 RX ORDER — CARVEDILOL 6.25 MG/1
6.25 TABLET ORAL 2 TIMES DAILY
Qty: 180 TABLET | OUTPATIENT
Start: 2025-02-03

## 2025-02-03 RX ORDER — OLOPATADINE HYDROCHLORIDE 1 MG/ML
1 SOLUTION/ DROPS OPHTHALMIC 2 TIMES DAILY
Qty: 1 EACH | Refills: 1 | Status: SHIPPED | OUTPATIENT
Start: 2025-02-03

## 2025-02-03 RX ORDER — CARVEDILOL 6.25 MG/1
6.25 TABLET ORAL 2 TIMES DAILY
Qty: 60 TABLET | Refills: 1 | Status: SHIPPED | OUTPATIENT
Start: 2025-02-03

## 2025-02-03 NOTE — TELEPHONE ENCOUNTER
LOV with Dr. Hobson on 1/31/25    New to Provider appt scheduled on 3/20/25 with Dr. Campbell    Please advise

## 2025-02-05 ENCOUNTER — OFFICE VISIT (OUTPATIENT)
Dept: OBGYN CLINIC | Age: 65
End: 2025-02-05
Payer: MEDICARE

## 2025-02-05 ENCOUNTER — HOSPITAL ENCOUNTER (OUTPATIENT)
Age: 65
Setting detail: SPECIMEN
Discharge: HOME | End: 2025-02-05

## 2025-02-05 VITALS
BODY MASS INDEX: 35.68 KG/M2 | WEIGHT: 177 LBS | HEIGHT: 59 IN | SYSTOLIC BLOOD PRESSURE: 140 MMHG | HEART RATE: 84 BPM | DIASTOLIC BLOOD PRESSURE: 86 MMHG | RESPIRATION RATE: 18 BRPM

## 2025-02-05 DIAGNOSIS — Z01.419 WELL FEMALE EXAM WITH ROUTINE GYNECOLOGICAL EXAM: Primary | ICD-10-CM

## 2025-02-05 DIAGNOSIS — Z12.31 ENCOUNTER FOR SCREENING MAMMOGRAM FOR BREAST CANCER: ICD-10-CM

## 2025-02-05 DIAGNOSIS — Z11.51 SPECIAL SCREENING EXAMINATION FOR HUMAN PAPILLOMAVIRUS (HPV): ICD-10-CM

## 2025-02-05 DIAGNOSIS — R68.82 LOW LIBIDO: ICD-10-CM

## 2025-02-05 PROCEDURE — G0101 CA SCREEN;PELVIC/BREAST EXAM: HCPCS | Performed by: NURSE PRACTITIONER

## 2025-02-05 PROCEDURE — 1159F MED LIST DOCD IN RCRD: CPT | Performed by: NURSE PRACTITIONER

## 2025-02-05 PROCEDURE — 3079F DIAST BP 80-89 MM HG: CPT | Performed by: NURSE PRACTITIONER

## 2025-02-05 PROCEDURE — 99213 OFFICE O/P EST LOW 20 MIN: CPT | Performed by: NURSE PRACTITIONER

## 2025-02-05 PROCEDURE — 3077F SYST BP >= 140 MM HG: CPT | Performed by: NURSE PRACTITIONER

## 2025-02-05 NOTE — PROGRESS NOTES
History and Physical  [facility]  [facility] [address]  [place], [place]  [address] [phone]   Fax [phone]  Miladys Baker  2027              64 y.o.  Chief Complaint   Patient presents with    Annual Exam       No LMP recorded. Patient is postmenopausal.             Primary Care Physician: Abel Cuenca MD    The patient was seen and examined. She has no chief complaint today and is here for her annual exam.  Her bowels are regular. There are no voiding complaints. She denies any bloating.  She denies vaginal discharge and was counseled on STD's and the need for barrier contraception.     HPI : Miladys Baker is a 64 y.o. female     Annual exam  When sexually active with bev, unable to achieve orgasm with stimulation prior to intercourse.  Patient reports this is a recent development in past 4 months.  Denies taking any new medication. Currently on blood pressure meds and allergy injections.  Her fiancee had recent penile implants to maintain an erection. Patient wants to be able to enjoy intercourse with him.  Been together for 2 years.  Denies PMB.   ________________________________________________________________________  OB History    Para Term  AB Living   3 3 3 0 0 0   SAB IAB Ectopic Molar Multiple Live Births   0 0 0 0 0 0      # Outcome Date GA Lbr Krishna/2nd Weight Sex Type Anes PTL Lv   3 Term            2 Term            1 Term              Past Medical History:   Diagnosis Date    Allergies     Asthma     Endometriosis     Hypertension     Skin cancer     on nose    Stroke (HCC)                                                                        Past Surgical History:   Procedure Laterality Date    BREAST CYST EXCISION Right     CEREBRAL ANGIOGRAM Bilateral 2024    CAROTID CEREBRAL    CEREBRAL ANGIOGRAM  2024     SECTION  1980    COLONOSCOPY N/A 2023    COLONOSCOPY POLYPECTOMY SNARE/COLD

## 2025-02-07 LAB
HPV I/H RISK 4 DNA CVX QL NAA+PROBE: NOT DETECTED
HPV SAMPLE: NORMAL
HPV, INTERPRETATION: NORMAL
HPV16 DNA CVX QL NAA+PROBE: NOT DETECTED
HPV18 DNA CVX QL NAA+PROBE: NOT DETECTED
SPECIMEN DESCRIPTION: NORMAL

## 2025-02-13 LAB — CYTOLOGY REPORT: NORMAL

## 2025-02-28 ENCOUNTER — OFFICE VISIT (OUTPATIENT)
Dept: PODIATRY | Age: 65
End: 2025-02-28
Payer: MEDICARE

## 2025-02-28 ENCOUNTER — OFFICE VISIT (OUTPATIENT)
Dept: NEUROLOGY | Age: 65
End: 2025-02-28
Payer: MEDICARE

## 2025-02-28 VITALS
SYSTOLIC BLOOD PRESSURE: 156 MMHG | HEIGHT: 59 IN | HEART RATE: 67 BPM | DIASTOLIC BLOOD PRESSURE: 74 MMHG | BODY MASS INDEX: 34.68 KG/M2 | WEIGHT: 172 LBS

## 2025-02-28 VITALS — WEIGHT: 172 LBS | BODY MASS INDEX: 34.72 KG/M2

## 2025-02-28 DIAGNOSIS — M79.675 PAIN OF TOES OF BOTH FEET: ICD-10-CM

## 2025-02-28 DIAGNOSIS — Z98.890 HISTORY OF SURGERY FOR CEREBRAL ANEURYSM: ICD-10-CM

## 2025-02-28 DIAGNOSIS — B35.1 ONYCHOMYCOSIS OF TOENAIL: Primary | ICD-10-CM

## 2025-02-28 DIAGNOSIS — I67.1 INTRACRANIAL ANEURYSM: Primary | ICD-10-CM

## 2025-02-28 DIAGNOSIS — M79.674 PAIN OF TOES OF BOTH FEET: ICD-10-CM

## 2025-02-28 PROCEDURE — 1159F MED LIST DOCD IN RCRD: CPT | Performed by: PODIATRIST

## 2025-02-28 PROCEDURE — 1125F AMNT PAIN NOTED PAIN PRSNT: CPT | Performed by: PODIATRIST

## 2025-02-28 PROCEDURE — 11720 DEBRIDE NAIL 1-5: CPT | Performed by: PODIATRIST

## 2025-02-28 PROCEDURE — 99215 OFFICE O/P EST HI 40 MIN: CPT | Performed by: STUDENT IN AN ORGANIZED HEALTH CARE EDUCATION/TRAINING PROGRAM

## 2025-02-28 PROCEDURE — 3078F DIAST BP <80 MM HG: CPT | Performed by: STUDENT IN AN ORGANIZED HEALTH CARE EDUCATION/TRAINING PROGRAM

## 2025-02-28 PROCEDURE — 99203 OFFICE O/P NEW LOW 30 MIN: CPT | Performed by: PODIATRIST

## 2025-02-28 PROCEDURE — 3077F SYST BP >= 140 MM HG: CPT | Performed by: STUDENT IN AN ORGANIZED HEALTH CARE EDUCATION/TRAINING PROGRAM

## 2025-02-28 PROCEDURE — 1123F ACP DISCUSS/DSCN MKR DOCD: CPT | Performed by: PODIATRIST

## 2025-02-28 PROCEDURE — 1123F ACP DISCUSS/DSCN MKR DOCD: CPT | Performed by: STUDENT IN AN ORGANIZED HEALTH CARE EDUCATION/TRAINING PROGRAM

## 2025-02-28 PROCEDURE — 1159F MED LIST DOCD IN RCRD: CPT | Performed by: STUDENT IN AN ORGANIZED HEALTH CARE EDUCATION/TRAINING PROGRAM

## 2025-02-28 NOTE — PROGRESS NOTES
Endovascular Neurosurgery Note    Pt Name: Miladys Baker  MRN: [id number]  YOB: 1960  Date of evaluation: 2/28/2025  Primary Care Physician: Abel Cuenca MD  Patient evaluated at the request of  Dr. Ott     Reason for evaluation:   R MCA bifurcation giant aneurysm s/p WEB embolization 1/13/2024      Subjective:     Presents for clinic visit. No new complaints or focal neuro deficits. We discussed the results from the DSA. She stated she prefers to just watch with close imaging/angiographic follow-ups instead of re-intervention. Will discuss with Dr. Garcia at next clinic visit.    HPI     Miladys Baker is a 64 y.o. female who presents with with sudden onset thunderclap headache with neck stiffness.  The patient denies any other focal neurological deficits.  CT head unrevealing for acute bleeding.  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     Of note, the patient has a very extensive significant family history of ruptured cerebral aneurysm (grandfather, father, paternal aunt who happened to be one of our patients as well)    The patient denies any history of vasculitis, kidney diseases, smoking.     Doing well, no complaint. Will proceed with a follow-up DSA in 12 months from first embolization     Modified Luciano scale 0  Hunt and Ibarra 2    Review of Systems:  CONSTITUTIONAL:  negative for fevers, chills, fatigue and malaise    EYES:  negative for double vision, blurred vision and photophobia     HEENT:  negative for tinnitus, epistaxis and sore throat    RESPIRATORY:  negative for cough, shortness of breath, wheezing    CARDIOVASCULAR:  negative for chest pain, palpitations, syncope, edema    GASTROINTESTINAL:  negative for nausea, vomiting    GENITOURINARY:  negative for incontinence    MUSCULOSKELETAL:  negative for neck or back pain    NEUROLOGICAL:  Negative for weakness and tingling  negative for

## 2025-02-28 NOTE — PROGRESS NOTES
after the patients visit. 3. Contact office with any questions/problems/concerns.  Return in about 9 weeks (around 5/2/2025) for Painful fungal nails.   2/28/2025      Nitish Rodas DPM

## 2025-03-20 ENCOUNTER — OFFICE VISIT (OUTPATIENT)
Dept: INTERNAL MEDICINE CLINIC | Age: 65
End: 2025-03-20

## 2025-03-20 VITALS
HEART RATE: 61 BPM | OXYGEN SATURATION: 97 % | SYSTOLIC BLOOD PRESSURE: 138 MMHG | DIASTOLIC BLOOD PRESSURE: 80 MMHG | BODY MASS INDEX: 35.73 KG/M2 | WEIGHT: 177 LBS

## 2025-03-20 DIAGNOSIS — J45.909 UNCOMPLICATED ASTHMA, UNSPECIFIED ASTHMA SEVERITY, UNSPECIFIED WHETHER PERSISTENT: ICD-10-CM

## 2025-03-20 DIAGNOSIS — I10 PRIMARY HYPERTENSION: Primary | ICD-10-CM

## 2025-03-20 DIAGNOSIS — K64.9 HEMORRHOIDS, UNSPECIFIED HEMORRHOID TYPE: ICD-10-CM

## 2025-03-20 DIAGNOSIS — T78.40XS ALLERGY, SEQUELA: ICD-10-CM

## 2025-03-20 DIAGNOSIS — L23.9 ALLERGIC DERMATITIS: ICD-10-CM

## 2025-03-20 DIAGNOSIS — E87.6 HYPOKALEMIA: ICD-10-CM

## 2025-03-20 DIAGNOSIS — E53.8 VITAMIN B12 DEFICIENCY: ICD-10-CM

## 2025-03-20 RX ORDER — ERGOCALCIFEROL 1.25 MG/1
50000 CAPSULE, LIQUID FILLED ORAL WEEKLY
Qty: 12 CAPSULE | Refills: 1 | Status: CANCELLED | OUTPATIENT
Start: 2025-03-20

## 2025-03-20 RX ORDER — ALBUTEROL SULFATE 90 UG/1
2 INHALANT RESPIRATORY (INHALATION) EVERY 6 HOURS PRN
Qty: 18 G | Refills: 3 | Status: SHIPPED | OUTPATIENT
Start: 2025-03-20

## 2025-03-20 RX ORDER — POTASSIUM CHLORIDE 1500 MG/1
20 TABLET, EXTENDED RELEASE ORAL DAILY
Qty: 90 TABLET | Refills: 0 | Status: SHIPPED | OUTPATIENT
Start: 2025-03-20

## 2025-03-20 RX ORDER — LISINOPRIL AND HYDROCHLOROTHIAZIDE 20; 25 MG/1; MG/1
1 TABLET ORAL DAILY
Qty: 30 TABLET | Refills: 3 | Status: SHIPPED | OUTPATIENT
Start: 2025-03-20

## 2025-03-20 RX ORDER — CARVEDILOL 6.25 MG/1
6.25 TABLET ORAL 2 TIMES DAILY
Qty: 60 TABLET | Refills: 1 | Status: SHIPPED | OUTPATIENT
Start: 2025-03-20

## 2025-03-20 RX ORDER — BETAMETHASONE DIPROPIONATE 0.5 MG/G
LOTION TOPICAL
Qty: 1 EACH | Refills: 1 | Status: SHIPPED | OUTPATIENT
Start: 2025-03-20

## 2025-03-20 RX ORDER — OLOPATADINE HYDROCHLORIDE 1 MG/ML
1 SOLUTION/ DROPS OPHTHALMIC 2 TIMES DAILY
Qty: 1 EACH | Refills: 1 | Status: SHIPPED | OUTPATIENT
Start: 2025-03-20

## 2025-03-20 RX ORDER — CETIRIZINE HYDROCHLORIDE 10 MG/1
10 TABLET ORAL DAILY
Qty: 90 TABLET | Refills: 3 | Status: SHIPPED | OUTPATIENT
Start: 2025-03-20

## 2025-03-20 RX ORDER — MULTIVITAMIN WITH IRON
500 TABLET ORAL DAILY
Qty: 30 TABLET | Refills: 3 | Status: SHIPPED | OUTPATIENT
Start: 2025-03-20 | End: 2026-03-20

## 2025-03-20 RX ORDER — HYDROCORTISONE ACETATE 25 MG/1
25 SUPPOSITORY RECTAL EVERY 12 HOURS
Qty: 1 SUPPOSITORY | Refills: 2 | Status: SHIPPED | OUTPATIENT
Start: 2025-03-20

## 2025-03-20 NOTE — PROGRESS NOTES
There is no abdominal tenderness. There is no guarding or rebound.   Musculoskeletal:         General: Normal range of motion.      Cervical back: Normal range of motion and neck supple.   Neurological:      Mental Status: She is alert and oriented to person, place, and time.          LABORATORY FINDINGS:    CBC:@BRIEFLAB(WBC:1,HGB:1,PLT:1)@    BMP:    Lab Results   Component Value Date/Time     09/24/2024 10:40 AM    K 3.5 09/24/2024 10:40 AM     09/24/2024 10:40 AM    CO2 29 09/24/2024 10:40 AM    BUN 16 09/24/2024 10:40 AM    CREATININE 0.7 01/02/2025 07:21 AM    CREATININE 0.8 09/24/2024 10:40 AM    GLUCOSE 103 09/24/2024 10:40 AM       HEMOGLOBIN A1C:   Lab Results   Component Value Date/Time    LABA1C 5.3 01/13/2024 09:11 AM       FASTING LIPID PANEL:  Lab Results   Component Value Date    CHOL 186 09/24/2024    HDL 66 09/24/2024    TRIG 81 09/24/2024       ASSESSMENT AND PLAN:    1. Hypokalemia    - potassium chloride (KLOR-CON M) 20 MEQ extended release tablet; Take 1 tablet by mouth daily  Dispense: 90 tablet; Refill: 0    2. Hemorrhoids, unspecified hemorrhoid type  - hydrocortisone (ANUSOL-HC) 25 MG suppository; Place 1 suppository rectally in the morning and 1 suppository in the evening.  Dispense: 1 suppository; Refill: 2    3. Allergic dermatitis  - betamethasone dipropionate 0.05 % lotion; Apply on neck topically 2 times daily.  Dispense: 1 each; Refill: 1    4. Primary hypertension    - lisinopril-hydroCHLOROthiazide (PRINZIDE;ZESTORETIC) 20-25 MG per tablet; Take 1 tablet by mouth daily  Dispense: 30 tablet; Refill: 3  - carvedilol (COREG) 6.25 MG tablet; Take 1 tablet by mouth 2 times daily  Dispense: 60 tablet; Refill: 1    5. Vitamin B12 deficiency  - vitamin B-12 (CYANOCOBALAMIN) 500 MCG tablet; Take 1 tablet by mouth daily  Dispense: 30 tablet; Refill: 3    6. Uncomplicated asthma, unspecified asthma severity, unspecified whether persistent  - albuterol sulfate HFA

## 2025-03-21 ASSESSMENT — ENCOUNTER SYMPTOMS
CHOKING: 0
EYE DISCHARGE: 0
ABDOMINAL DISTENTION: 0
EYE REDNESS: 0
COLOR CHANGE: 0
COUGH: 0
ABDOMINAL PAIN: 0
APNEA: 0
BLOOD IN STOOL: 0
EYE ITCHING: 0
CHEST TIGHTNESS: 0
BACK PAIN: 0
DIARRHEA: 0
EYE PAIN: 0
CONSTIPATION: 0
SHORTNESS OF BREATH: 0

## 2025-03-24 DIAGNOSIS — I10 PRIMARY HYPERTENSION: ICD-10-CM

## 2025-03-24 RX ORDER — CARVEDILOL 6.25 MG/1
6.25 TABLET ORAL 2 TIMES DAILY
Qty: 60 TABLET | Refills: 3 | Status: SHIPPED | OUTPATIENT
Start: 2025-03-24

## 2025-03-24 NOTE — TELEPHONE ENCOUNTER
[facility] faxed refill request for Carvedilol  Last office visit 3/20/2025

## 2025-03-29 DIAGNOSIS — I10 PRIMARY HYPERTENSION: ICD-10-CM

## 2025-03-31 RX ORDER — CARVEDILOL 6.25 MG/1
6.25 TABLET ORAL 2 TIMES DAILY
Qty: 60 TABLET | Refills: 3 | OUTPATIENT
Start: 2025-03-31

## 2025-04-28 ENCOUNTER — TELEPHONE (OUTPATIENT)
Dept: INTERNAL MEDICINE CLINIC | Age: 65
End: 2025-04-28

## 2025-05-02 ENCOUNTER — OFFICE VISIT (OUTPATIENT)
Dept: PODIATRY | Age: 65
End: 2025-05-02

## 2025-05-02 VITALS — WEIGHT: 177 LBS | HEIGHT: 59 IN | BODY MASS INDEX: 35.68 KG/M2

## 2025-05-02 DIAGNOSIS — B35.1 ONYCHOMYCOSIS OF TOENAIL: Primary | ICD-10-CM

## 2025-05-02 DIAGNOSIS — M79.674 PAIN OF TOES OF BOTH FEET: ICD-10-CM

## 2025-05-02 DIAGNOSIS — M79.675 PAIN OF TOES OF BOTH FEET: ICD-10-CM

## 2025-05-02 RX ORDER — MONTELUKAST SODIUM 10 MG/1
10 TABLET ORAL
COMMUNITY

## 2025-05-02 RX ORDER — AMLODIPINE BESYLATE 10 MG/1
10 TABLET ORAL
COMMUNITY

## 2025-05-02 RX ORDER — LISINOPRIL 40 MG/1
40 TABLET ORAL
COMMUNITY

## 2025-05-02 RX ORDER — HYDROCHLOROTHIAZIDE 50 MG/1
TABLET ORAL
COMMUNITY

## 2025-05-07 ASSESSMENT — ENCOUNTER SYMPTOMS
NAUSEA: 0
COLOR CHANGE: 0
SHORTNESS OF BREATH: 0
BACK PAIN: 0
DIARRHEA: 0

## 2025-05-07 NOTE — PROGRESS NOTES
SUBJECTIVE: Miladys Baker is a 64 y.o. female who returns to the office with chief complaint of painful fungal toenails. Patient relates toe nails are thickened/difficult to trim as well as painful with ambulation and with shoe gear.   Chief Complaint   Patient presents with    Nail Problem     Nail trim/last saw Dr.Puneet Campbell 3/20/25     Review of Systems   Constitutional:  Negative for activity change, appetite change, chills, diaphoresis, fatigue and fever.   Respiratory:  Negative for shortness of breath.    Cardiovascular:  Negative for leg swelling.   Gastrointestinal:  Negative for diarrhea and nausea.   Endocrine: Negative for cold intolerance, heat intolerance and polyuria.   Musculoskeletal:  Positive for arthralgias. Negative for back pain, gait problem, joint swelling and myalgias.   Skin:  Negative for color change, pallor, rash and wound.   Allergic/Immunologic: Negative for environmental allergies and food allergies.   Neurological:  Negative for dizziness, weakness, light-headedness and numbness.   Hematological:  Does not bruise/bleed easily.   Psychiatric/Behavioral:  Negative for behavioral problems, confusion and self-injury. The patient is not nervous/anxious.      OBJECTIVE: Clinical evaluation of patient reveals nails 1,2,3,4,5 of the right foot and nails 1,2,3,4,5 of the left foot to present with thickness, elongation, discoloration, brittleness, and subungual debris. There was pain with palpation and debridement of the toenails of the bilateral feet. No open lesions noted to either foot today.     Class A Findings (1 needed)   [] Non-traumatic amputation of foot or integral skeleton portion thereof.   [] Q7.      Class B Findings (2 needed)   1. [] Absent posterior tibial pulse   2. [] Absent dorsalis pedis pulse   3. [] Advanced trophic changes; three of the following are required:   ·         [] hair growth (decrease or absence)   ·         [] nail changes (thickening)   ·

## 2025-05-29 ENCOUNTER — OFFICE VISIT (OUTPATIENT)
Dept: INTERNAL MEDICINE CLINIC | Age: 65
End: 2025-05-29
Payer: MEDICARE

## 2025-05-29 VITALS
OXYGEN SATURATION: 97 % | TEMPERATURE: 98.1 F | BODY MASS INDEX: 30.58 KG/M2 | RESPIRATION RATE: 18 BRPM | HEIGHT: 63 IN | WEIGHT: 172.6 LBS | DIASTOLIC BLOOD PRESSURE: 62 MMHG | HEART RATE: 65 BPM | SYSTOLIC BLOOD PRESSURE: 104 MMHG

## 2025-05-29 DIAGNOSIS — J45.909 UNCOMPLICATED ASTHMA, UNSPECIFIED ASTHMA SEVERITY, UNSPECIFIED WHETHER PERSISTENT: ICD-10-CM

## 2025-05-29 DIAGNOSIS — Z00.00 MEDICARE ANNUAL WELLNESS VISIT, SUBSEQUENT: Primary | ICD-10-CM

## 2025-05-29 DIAGNOSIS — L23.9 ALLERGIC DERMATITIS: ICD-10-CM

## 2025-05-29 DIAGNOSIS — E87.6 HYPOKALEMIA: ICD-10-CM

## 2025-05-29 DIAGNOSIS — Z71.89 LIVING WILL, COUNSELING/DISCUSSION: ICD-10-CM

## 2025-05-29 DIAGNOSIS — E53.8 VITAMIN B12 DEFICIENCY: ICD-10-CM

## 2025-05-29 DIAGNOSIS — I10 PRIMARY HYPERTENSION: ICD-10-CM

## 2025-05-29 DIAGNOSIS — T78.40XS ALLERGY, SEQUELA: ICD-10-CM

## 2025-05-29 PROCEDURE — 3078F DIAST BP <80 MM HG: CPT | Performed by: INTERNAL MEDICINE

## 2025-05-29 PROCEDURE — 1159F MED LIST DOCD IN RCRD: CPT | Performed by: INTERNAL MEDICINE

## 2025-05-29 PROCEDURE — 1123F ACP DISCUSS/DSCN MKR DOCD: CPT | Performed by: INTERNAL MEDICINE

## 2025-05-29 PROCEDURE — 3074F SYST BP LT 130 MM HG: CPT | Performed by: INTERNAL MEDICINE

## 2025-05-29 PROCEDURE — G0439 PPPS, SUBSEQ VISIT: HCPCS | Performed by: INTERNAL MEDICINE

## 2025-05-29 RX ORDER — MULTIVITAMIN WITH IRON
500 TABLET ORAL DAILY
Qty: 30 TABLET | Refills: 3 | Status: SHIPPED | OUTPATIENT
Start: 2025-05-29 | End: 2026-05-29

## 2025-05-29 RX ORDER — ALBUTEROL SULFATE 90 UG/1
2 INHALANT RESPIRATORY (INHALATION) EVERY 6 HOURS PRN
Qty: 18 G | Refills: 3 | Status: SHIPPED | OUTPATIENT
Start: 2025-05-29

## 2025-05-29 RX ORDER — ERGOCALCIFEROL 1.25 MG/1
50000 CAPSULE, LIQUID FILLED ORAL WEEKLY
Qty: 12 CAPSULE | Refills: 1 | Status: SHIPPED | OUTPATIENT
Start: 2025-05-29

## 2025-05-29 RX ORDER — HYDROCHLOROTHIAZIDE 50 MG/1
50 TABLET ORAL DAILY
Qty: 90 TABLET | Refills: 3 | Status: SHIPPED | OUTPATIENT
Start: 2025-05-29

## 2025-05-29 RX ORDER — CETIRIZINE HYDROCHLORIDE 10 MG/1
10 TABLET ORAL DAILY
Qty: 90 TABLET | Refills: 3 | Status: SHIPPED | OUTPATIENT
Start: 2025-05-29

## 2025-05-29 RX ORDER — BETAMETHASONE DIPROPIONATE 0.5 MG/G
LOTION TOPICAL
Qty: 1 EACH | Refills: 1 | Status: SHIPPED | OUTPATIENT
Start: 2025-05-29

## 2025-05-29 RX ORDER — IBUPROFEN 600 MG/1
600 TABLET, FILM COATED ORAL 4 TIMES DAILY PRN
Qty: 360 TABLET | Refills: 1 | Status: SHIPPED | OUTPATIENT
Start: 2025-05-29

## 2025-05-29 RX ORDER — MONTELUKAST SODIUM 10 MG/1
10 TABLET ORAL NIGHTLY
Qty: 90 TABLET | Refills: 1 | Status: SHIPPED | OUTPATIENT
Start: 2025-05-29

## 2025-05-29 RX ORDER — AMLODIPINE BESYLATE 10 MG/1
10 TABLET ORAL DAILY
Qty: 90 TABLET | Refills: 3 | Status: SHIPPED | OUTPATIENT
Start: 2025-05-29

## 2025-05-29 RX ORDER — POTASSIUM CHLORIDE 1500 MG/1
20 TABLET, EXTENDED RELEASE ORAL DAILY
Qty: 90 TABLET | Refills: 0 | Status: SHIPPED | OUTPATIENT
Start: 2025-05-29

## 2025-05-29 RX ORDER — CARVEDILOL 6.25 MG/1
6.25 TABLET ORAL 2 TIMES DAILY
Qty: 60 TABLET | Refills: 3 | Status: SHIPPED | OUTPATIENT
Start: 2025-05-29

## 2025-05-29 RX ORDER — LISINOPRIL 40 MG/1
40 TABLET ORAL DAILY
Qty: 90 TABLET | Refills: 0 | Status: SHIPPED | OUTPATIENT
Start: 2025-05-29

## 2025-05-29 ASSESSMENT — LIFESTYLE VARIABLES
HOW MANY STANDARD DRINKS CONTAINING ALCOHOL DO YOU HAVE ON A TYPICAL DAY: 1 OR 2
HOW OFTEN DO YOU HAVE A DRINK CONTAINING ALCOHOL: MONTHLY OR LESS

## 2025-05-29 ASSESSMENT — PATIENT HEALTH QUESTIONNAIRE - PHQ9
SUM OF ALL RESPONSES TO PHQ QUESTIONS 1-9: 1
1. LITTLE INTEREST OR PLEASURE IN DOING THINGS: NOT AT ALL
SUM OF ALL RESPONSES TO PHQ QUESTIONS 1-9: 1
SUM OF ALL RESPONSES TO PHQ QUESTIONS 1-9: 1
2. FEELING DOWN, DEPRESSED OR HOPELESS: SEVERAL DAYS
SUM OF ALL RESPONSES TO PHQ QUESTIONS 1-9: 1

## 2025-05-29 NOTE — TELEPHONE ENCOUNTER
Miladys Baker is calling to request a refill on the following medication(s):    Medication Request:  Requested Prescriptions     Pending Prescriptions Disp Refills    vitamin D (ERGOCALCIFEROL) 1.25 MG (85663 UT) CAPS capsule 12 capsule 1     Sig: Take 1 capsule by mouth once a week    vitamin B-12 (CYANOCOBALAMIN) 500 MCG tablet 30 tablet 3     Sig: Take 1 tablet by mouth daily    albuterol sulfate HFA (PROVENTIL;VENTOLIN;PROAIR) 108 (90 Base) MCG/ACT inhaler 18 g 3     Sig: Inhale 2 puffs into the lungs every 6 hours as needed for Wheezing    betamethasone dipropionate 0.05 % lotion 1 each 1     Sig: Apply on neck topically 2 times daily.    carvedilol (COREG) 6.25 MG tablet 60 tablet 3     Sig: Take 1 tablet by mouth 2 times daily    cetirizine (ZYRTEC) 10 MG tablet 90 tablet 3     Sig: Take 1 tablet by mouth daily    potassium chloride (KLOR-CON M) 20 MEQ extended release tablet 90 tablet 0     Sig: Take 1 tablet by mouth daily    hydroCHLOROthiazide (HYDRODIURIL) 50 MG tablet 30 tablet     lisinopril (PRINIVIL;ZESTRIL) 40 MG tablet 30 tablet      Sig: Take 1 tablet by mouth    montelukast (SINGULAIR) 10 MG tablet 30 tablet      Sig: Take 1 tablet by mouth    amLODIPine (NORVASC) 10 MG tablet 30 tablet      Sig: Take 1 tablet by mouth    ibuprofen (ADVIL;MOTRIN) 600 MG tablet 360 tablet 1     Sig: Take 1 tablet by mouth 4 times daily as needed for Pain       Last Visit Date (If Applicable):  3/20/2025 5/29/2025 CHRISTOS Arceo    Next Visit Date:    10/21/2025      Requesting rx for ibuprofen, had one awhile ago and was still taking but running low    Rx pending

## 2025-05-29 NOTE — PROGRESS NOTES
worried about falling? no no   2 or more falls in past year? no no   Fall with injury in past year? no no   Timed Up and Go Test > 12 seconds? -- --     Cognitive Screening Results    [facility] Office Visit from 5/29/2025 in [facility] Office Visit from 7/26/2024 in [facility]   Cognitive Screening: Mini-Cog     Cognitive Unable to Assess -- --   Clock Drawing Test (CDT) Normal Normal   Words recalled 2 Words Recalled 3 Words Recalled   Total Score 4 5   Total Score Interpretation Normal Mini-Cog Normal Mini-Cog     Depression Screening Results    [facility] Office Visit from 5/29/2025 in [facility] Office Visit from 1/31/2025 in [facility]   PHQ-2 Over the past 2 weeks, how often have you been bothered by any of the following problems?     Little interest or pleasure in doing things Not at all Not at all   Feeling down, depressed, or hopeless Several days  [worries sometimes] Not at all   PHQ-2 Score 1 0   PHQ-9 Over the past 2 weeks, how often have you been bothered by any of the following problems?     PHQ-9 Total Score 1 0   PHQ-9 Total Score 1 0   AMB C-SSRS Suicide Screening       Alcohol Screening Results    [facility] Office Visit from 5/29/2025 in [facility] ED from 1/12/2024 in [facility] Department   Q1: How often do you have a drink containing alcohol? Monthly or less Never   Q2: How many drinks containing alcohol do you have on a typical day when you are drinking? 1 or 2 Patient does not drink   Q3: How often do you have six or more drinks on one occasion? Never Never   AUDIT-C Score 1 -1     DAST-10 Screening Results    [facility] Office Visit from 5/29/2025 in [facility]   How many times in the past year have you used a recreational drug or used a prescription medication for nonmedical reasons? None     Health Risk Assessment Results    [facility] Office Visit from 5/29/2025 in [facility] Office Visit from

## 2025-05-30 ENCOUNTER — CLINICAL DOCUMENTATION (OUTPATIENT)
Age: 65
End: 2025-05-30

## 2025-05-30 NOTE — ACP (ADVANCE CARE PLANNING)
Advance Care Planning   Ambulatory ACP Specialist Patient Outreach    Date:  5/30/2025    ACP Specialist:  Sahra Khan MA    Outreach call to patient in follow-up to ACP Specialist referral from:Mick Hobson MD    [] PCP  [x] Provider   [] Ambulatory Care Management [] Other     For:                  [x] Advance Directive Assistance              [] Complete Portable DNR order              [] Complete POST/POLST/MOST              [] Code Status Discussion             [] Discuss Goals of Care             [] Early ACP Decision-Making              [] Other (Specify)    Date Referral Received:05/29/2025    Next Step:   [] ACP scheduled conversation  [x] Outreach again in one week               [] Email / Mail ACP Info Sheets  [] Email / Mail Advance Directive   [] Closing referral.  Routing closure to referring provider/staff and to ACP Specialist .    [] Closure letter mailed to patient with invitation to contact ACP Specialist if / when ready.   [] Other (Specify here):       [x] At this time, Healthcare Decision Maker Is:      Primary Decision Maker: Miguel Baker - Child - [phone]       [] Primary agent named in scanned advance directive.    [x] Legal Next of Kin.     [] Unable to determine legal decision maker at this time.    Outreaches:       [x] 1st -  Date:  05/30/2025               Intervention:  [] Spoke with Patient   [] Left Voice mail [x] Email / Mail    [] [email or web address]  [] Other (Specify) :     Outcomes:Writer attempted ACP outreach and placed call to number on file for both home and mobile ([phone]) and there was no answer. Phone continued to ring and an  states that patient is not available and to try again later. Writer emailed outreach letter to patient using the email address on file with ACP Coordinator's contact information and encouraged patient to return call. If no return call received, will attempt another outreach in about one week.           [] 2nd -

## 2025-06-26 ENCOUNTER — TELEPHONE (OUTPATIENT)
Dept: INTERNAL MEDICINE CLINIC | Age: 65
End: 2025-06-26

## 2025-07-09 ENCOUNTER — TELEPHONE (OUTPATIENT)
Dept: INTERNAL MEDICINE CLINIC | Age: 65
End: 2025-07-09

## 2025-07-17 DIAGNOSIS — J45.909 UNCOMPLICATED ASTHMA, UNSPECIFIED ASTHMA SEVERITY, UNSPECIFIED WHETHER PERSISTENT: ICD-10-CM

## 2025-07-17 RX ORDER — ALBUTEROL SULFATE 90 UG/1
2 INHALANT RESPIRATORY (INHALATION) EVERY 6 HOURS PRN
Qty: 18 G | Refills: 3 | Status: SHIPPED | OUTPATIENT
Start: 2025-07-17

## 2025-08-06 DIAGNOSIS — E87.6 HYPOKALEMIA: ICD-10-CM

## 2025-08-06 RX ORDER — POTASSIUM CHLORIDE 1500 MG/1
20 TABLET, EXTENDED RELEASE ORAL DAILY
Qty: 90 TABLET | Refills: 0 | Status: SHIPPED | OUTPATIENT
Start: 2025-08-06

## 2025-08-13 RX ORDER — LISINOPRIL 40 MG/1
40 TABLET ORAL DAILY
Qty: 90 TABLET | Refills: 0 | Status: SHIPPED | OUTPATIENT
Start: 2025-08-13

## 2025-08-19 ENCOUNTER — OFFICE VISIT (OUTPATIENT)
Dept: PODIATRY | Age: 65
End: 2025-08-19
Payer: MEDICARE

## 2025-08-19 VITALS — WEIGHT: 172 LBS | HEIGHT: 63 IN | BODY MASS INDEX: 30.48 KG/M2

## 2025-08-19 DIAGNOSIS — B35.1 ONYCHOMYCOSIS OF TOENAIL: Primary | ICD-10-CM

## 2025-08-19 DIAGNOSIS — M79.675 PAIN OF TOES OF BOTH FEET: ICD-10-CM

## 2025-08-19 DIAGNOSIS — M79.674 PAIN OF TOES OF BOTH FEET: ICD-10-CM

## 2025-08-19 PROCEDURE — 11721 DEBRIDE NAIL 6 OR MORE: CPT | Performed by: PODIATRIST

## 2025-08-19 PROCEDURE — 99999 PR OFFICE/OUTPT VISIT,PROCEDURE ONLY: CPT | Performed by: PODIATRIST

## 2025-08-19 RX ORDER — POTASSIUM CHLORIDE 1500 MG/1
20 TABLET, EXTENDED RELEASE ORAL DAILY
COMMUNITY
Start: 2025-08-05

## 2025-08-19 RX ORDER — EPINEPHRINE 0.3 MG/.3ML
INJECTION SUBCUTANEOUS
COMMUNITY
Start: 2025-06-01

## 2025-08-19 RX ORDER — LISINOPRIL AND HYDROCHLOROTHIAZIDE 20; 25 MG/1; MG/1
1 TABLET ORAL DAILY
COMMUNITY
Start: 2025-07-07

## 2025-08-19 ASSESSMENT — ENCOUNTER SYMPTOMS
DIARRHEA: 0
BACK PAIN: 0
COLOR CHANGE: 0
SHORTNESS OF BREATH: 0
NAUSEA: 0

## (undated) DEVICE — GLOVE ORANGE PI 7 1/2   MSG9075

## (undated) DEVICE — DEFENDO AIR WATER SUCTION AND BIOPSY VALVE KIT FOR  OLYMPUS: Brand: DEFENDO AIR/WATER/SUCTION AND BIOPSY VALVE

## (undated) DEVICE — ENDO KIT W/SYRINGE: Brand: MEDLINE INDUSTRIES, INC.

## (undated) DEVICE — POLYP TRAP: Brand: TRAPEASE®

## (undated) DEVICE — SNARE ENDOSCP L240CM LOOP W13MM DIA2.4MM SHT THROW SM OVL